# Patient Record
Sex: FEMALE | Race: WHITE | URBAN - METROPOLITAN AREA
[De-identification: names, ages, dates, MRNs, and addresses within clinical notes are randomized per-mention and may not be internally consistent; named-entity substitution may affect disease eponyms.]

---

## 2017-06-21 ENCOUNTER — OFFICE VISIT (OUTPATIENT)
Dept: DERMATOLOGY | Age: 37
End: 2017-06-21

## 2017-06-21 DIAGNOSIS — D22.9 BENIGN NEVUS: ICD-10-CM

## 2017-06-21 DIAGNOSIS — L91.8 INFLAMED SKIN TAG: Primary | ICD-10-CM

## 2017-06-21 DIAGNOSIS — Z86.018 HISTORY OF DYSPLASTIC NEVUS: ICD-10-CM

## 2017-06-21 DIAGNOSIS — L71.9 ROSACEA: ICD-10-CM

## 2017-06-21 PROCEDURE — 99214 OFFICE O/P EST MOD 30 MIN: CPT | Performed by: DERMATOLOGY

## 2017-06-21 PROCEDURE — 11200 RMVL SKIN TAGS UP TO&INC 15: CPT | Performed by: DERMATOLOGY

## 2017-06-21 RX ORDER — METRONIDAZOLE 7.5 MG/G
GEL TOPICAL
Qty: 60 G | Refills: 3 | Status: SHIPPED | OUTPATIENT
Start: 2017-06-21 | End: 2019-05-23

## 2017-11-22 ENCOUNTER — OFFICE VISIT (OUTPATIENT)
Dept: ORTHOPEDIC SURGERY | Age: 37
End: 2017-11-22

## 2017-11-22 VITALS
DIASTOLIC BLOOD PRESSURE: 84 MMHG | HEART RATE: 73 BPM | HEIGHT: 64 IN | BODY MASS INDEX: 32.44 KG/M2 | SYSTOLIC BLOOD PRESSURE: 126 MMHG | WEIGHT: 190 LBS

## 2017-11-22 DIAGNOSIS — S46.111A LABRAL TEAR OF LONG HEAD OF BICEPS TENDON, RIGHT, INITIAL ENCOUNTER: Primary | ICD-10-CM

## 2017-11-22 PROCEDURE — 73030 X-RAY EXAM OF SHOULDER: CPT | Performed by: ORTHOPAEDIC SURGERY

## 2017-11-22 PROCEDURE — 99203 OFFICE O/P NEW LOW 30 MIN: CPT | Performed by: ORTHOPAEDIC SURGERY

## 2017-11-22 RX ORDER — MELOXICAM 15 MG/1
15 TABLET ORAL DAILY
Qty: 30 TABLET | Refills: 3 | Status: SHIPPED | OUTPATIENT
Start: 2017-11-22 | End: 2018-03-23 | Stop reason: SDUPTHER

## 2017-11-22 NOTE — PROGRESS NOTES
Chief Complaint    New Patient (right shoulder pain)      History of Present Illness:  Glory Blanco is a 30 y.o. woman here today for right shoulder evaluation. Patient states that she has had intermittent pain in her right shoulder since she was in high school. She swam competitively through high school and through her freshman year in college. She states that since her competitive swimming career has been over she has been very active with resistance exercises. She states that she is now having difficulties with doing pushups and other body weight exercises. She denies any real inciting event or trauma, but states that certain positions have always bothered her since she was in high school. She denies any numbness tingling or paresthesias of the right upper extremity. She denies any symptoms in the left shoulder. When asked to localize the pain she points over the top of her shoulder. Pain Assessment  Location of Pain: Shoulder  Location Modifiers: Right  Severity of Pain: 3  Quality of Pain: Aching  Frequency of Pain: Intermittent  Aggravating Factors: Exercise (reaching up and behind)  Limiting Behavior: Yes  Result of Injury: No  Work-Related Injury: No  Are there other pain locations you wish to document?: No    Medical History:    No notes on file    Patient's medications, allergies, past medical, surgical, social and family histories were reviewed and updated as appropriate. History reviewed. No pertinent past medical history. Social History     Social History    Marital status: Single     Spouse name: N/A    Number of children: N/A    Years of education: N/A     Occupational History    Not on file.      Social History Main Topics    Smoking status: Never Smoker    Smokeless tobacco: Never Used    Alcohol use Yes    Drug use: Unknown    Sexual activity: Not on file     Other Topics Concern    Not on file     Social History Narrative    No narrative on file Allergies   Allergen Reactions    Gluten Meal     Sulfa Antibiotics Nausea And Vomiting     Current Outpatient Prescriptions on File Prior to Visit   Medication Sig Dispense Refill    metroNIDAZOLE (METROGEL) 0.75 % gel Apply to face BID 60 g 3    sertraline (ZOLOFT) 50 MG tablet       EPIPEN 2-MICHEL 0.3 MG/0.3ML SOAJ injection       methylphenidate (RITALIN) 20 MG tablet Take 20 mg by mouth 2 times daily      Mometasone Furoate (NASONEX NA) by Nasal route       No current facility-administered medications on file prior to visit. Review of Systems:  Relevant review of systems reviewed and available in the patient's chart    Vital Signs:  Vitals:    11/22/17 1721   BP: 126/84   Pulse: 73       Physical Exam    General:  AAOx3, No acute distress     right Shoulder Examination:    Inspection:  No gross abnormalities    Palpation:  No tenderness over the rotator cuff footprint, the acromioclavicular joint, or the posterior capsule    Active Range of Motion: Forward elevation 160°, Abduction 160°, ER 40°, IR T8    Passive Range of Motion: As above    Strength:  5/5 strength testing of Elizabeth test, 5/5 strength of resisted ER, and 5/5 strength of belly press test    Special Tests:  Positive resisted pro sign. Positive Whipple. Positive [place]'s. Neurovascular: Normal motor and sensory function to radial, ulnar, median, and axillary nerve distributions. Palpable radial pulse with brisk (<3sec) capillary refill. Radiology:     Plain radiographs of the right shoulder, comprising 3 views were obtained and reviewed in the office:     Impression: No focal bony abnormalities or obvious osseous defects         Assessment :  Right shoulder biceps-labrum complex tear    Impression:  Encounter Diagnosis   Name Primary?     Labral tear of long head of biceps tendon, right, initial encounter Yes       Office Procedures:  Orders Placed This Encounter   Procedures    XR SHOULDER RIGHT (MIN 2 VIEWS)     [id number]

## 2017-11-28 RX ORDER — DIAZEPAM 5 MG/1
TABLET ORAL
Qty: 2 TABLET | Refills: 0 | Status: SHIPPED | OUTPATIENT
Start: 2017-11-28 | End: 2018-05-02

## 2017-12-06 ENCOUNTER — OFFICE VISIT (OUTPATIENT)
Dept: ORTHOPEDIC SURGERY | Age: 37
End: 2017-12-06

## 2017-12-06 VITALS
HEIGHT: 64 IN | WEIGHT: 190.04 LBS | HEART RATE: 63 BPM | BODY MASS INDEX: 32.44 KG/M2 | DIASTOLIC BLOOD PRESSURE: 72 MMHG | SYSTOLIC BLOOD PRESSURE: 111 MMHG

## 2017-12-06 DIAGNOSIS — M75.111 NONTRAUMATIC INCOMPLETE RUPTURE OF ROTATOR CUFF, RIGHT: Primary | ICD-10-CM

## 2017-12-06 PROCEDURE — 99213 OFFICE O/P EST LOW 20 MIN: CPT | Performed by: ORTHOPAEDIC SURGERY

## 2017-12-09 NOTE — PROGRESS NOTES
History of Present Illness:  [address] for follow-up of her right shoulder and to review the results of the MRI. She tolerated the MRI well. Since her last visit she's been taking meloxicam 15 mg once a day. She thinks that it may be helping a little bit. She has not had any other intervention. She denies any reinjury. Medical History:  Patient's medications, allergies, past medical, surgical, social and family histories were reviewed and updated as appropriate. Pertinent items are noted in HPI  Review of systems reviewed from Patient History Form dated on November 22, 2017 and available in the patient's chart under the Media tab. Vital Signs:  Vitals:    12/06/17 1529   BP: 111/72   Pulse: 63     Constitutional: She is in no apparent distress. She appears her stated age of 30. Right Shoulder Examination:    Inspection:  The right shoulder is benign appearing with no signs of any reinjury. Palpation:  Point of maximal tenderness is over the rotator cuff. No obvious crepitus. Active Range of Motion: Range of motion is preserved but painful at the end range. Passive Range of Motion:  Painful arc and elevation. Strength:  Weakness and pain inhibition of supraspinatus strength testing. Special Tests:  Positive Neer and positive Elizabeth test.  No Tristin muscle deformity. Radiology:     The MRI shows a right shoulder Rim rent lesion. This is a focal high-grade partial thickness tear measuring 5-6 mm in length. There is a type III acromion with an obvious look. Assessment :  Partial rotator cuff tearing with impingement right shoulder. A trial of conservative treatment is most appropriate for this 47-year-old woman. Impression:  Encounter Diagnosis   Name Primary?     Nontraumatic incomplete rupture of rotator cuff, right Yes       Office Procedures:  Orders Placed This Encounter   Procedures    Ambulatory referral to Physical Therapy     Referral Priority: Routine     Referral Type:   Eval and Treat     Referral Reason:   Specialty Services Required     Requested Specialty:   Physical Therapy     Number of Visits Requested:   1       Treatment Plan:  I offered a corticosteroid injection but she would like to hold off on this. We'll have her continue with the meloxicam.  Most importantly we'll get her into physical therapy. She'll work on 4 quadrant stretches and periscapular and rotator cuff strengthening. We've written a prescription. Follow-up will be in for 5 weeks. All questions were answered today. Dona Gutiérrez MD, PhD  12/6/2017

## 2017-12-14 ENCOUNTER — HOSPITAL ENCOUNTER (OUTPATIENT)
Dept: PHYSICAL THERAPY | Age: 37
Discharge: HOME | End: 2017-12-31
Admitting: ORTHOPAEDIC SURGERY

## 2017-12-14 NOTE — PLAN OF CARE
The [address] [place] [address]      Physical Therapy Certification    Dear Referring Practitioner: Dr. Lees Rater,    We had the pleasure of evaluating the following patient for physical therapy services at [address] [place] [address]. A summary of our findings can be found in the initial assessment below. This includes our plan of care. If you have any questions or concerns regarding these findings, please do not hesitate to contact me at the office phone number checked above. Thank you for the referral.       Physician Signature:_______________________________Date:__________________  By signing above (or electronic signature), therapists plan is approved by physician      Patient: Anny Sotelo   : 1980   MRN: [id number]  Referring Physician: Referring Practitioner: Dr. Lees Rater      Evaluation Date: 2017      Medical Diagnosis Information:  Diagnosis: M75.111 (ICD-10-CM) - Nontraumatic incomplete rupture of rotator cuff, right   Treatment Diagnosis: Decreased functional mobility ; Decreased ADL status; Decreased ROM; Decreased strength; d/t pain overhead from possible RCT                                         Insurance information: PT Insurance Information: PT BENEFITS 2017 FACILITY/ UMR/ EFFECTIVE 17/ ACTIVE/  MET/ OOP 4000/ COPAY 0/ PAYS 80%/ 20 VPCY/ 0 AUTH REQ/ COLLECT 50 FOR EVAL AND 25 FOR FOLLOW UP VISITS/ 17 CB/     Precautions/ Contra-indications:  Latex Allergy:  [x]NO      []YES  Preferred Language for Healthcare:   [x]English       []other:    SUBJECTIVE: Patient is a 29 y/o female who presents with c/o RUE pain; pt is RHD. States that she has had intermittent pain in her right shoulder since she was in high school. She swam competitively through high school and through her freshman year in college.   She states that since her competitive swimming career has been over she has been very active with resistance exercises. She states that she is now having difficulties with doing pushups and other body weight exercises. She denies any real inciting event or trauma, but states that certain positions have always bothered her since she was in high school. Struggles to blow dry her hair and with lifting objects that are over 10# overhead due to pain. When asked to localize the pain she points over the top of her shoulder. Had MRI, per report: has \"focal parital articular-sided microsavulsion of fibers along the farthest anterior and lateral supraspinatus tendon without substantive retraction\". Relevant Medical History: N/A  Functional Disability Index:PT G-Codes  Functional Assessment Tool Used: UEFI  Score: 61/80 or 24% deficit  Functional Limitation: Carrying, moving and handling objects  Carrying, Moving and Handling Objects Current Status (): At least 20 percent but less than 40 percent impaired, limited or restricted  Carrying, Moving and Handling Objects Goal Status (): At least 1 percent but less than 20 percent impaired, limited or restricted    Pain Scale: 2-6/10  Easing factors: rest, meloxicam  Provocative factors: any bodyweight or athletic activities as well as lifting overhead and reaching behind her head    Type: []Constant   [x]Intermittent  []Radiating [x]Localized []other:     Numbness/Tingling: N/A    Occupation/School: School Administration    Living Status/Prior Level of Function: Independent with ADLs and IADLs, lifting overhead and performing athletic activities.       OBJECTIVE:     ROM PROM AROM  Comment    L R L R    Flexion 155 145      Abduction 155 140      ER 95 95      IR 45 31          Strength L R Comment   Flexion 4+ 4+    Abduction 4+ 4+    ER 4 4-    IR 4+ 4+        Special Tests Results/Comment   Renan pos   Neers pos   Empty Can pos   Belly Press neg   ER Lag neg   Hornblower neg     Reflexes/Sensation:    [x]Dermatomes/Myotomes intact    [x]Reflexes equal and normal bilaterally   []Other:    Joint mobility:   [x]Normal    []Hypo   []Hyper    Palpation: ttp at [facility] origin                      [x] Patient history, allergies, meds reviewed. Medical chart reviewed. See intake form. Review Of Systems (ROS):  [x]Performed Review of systems (Integumentary, CardioPulmonary, Neurological) by intake and observation. Intake form has been scanned into medical record. Patient has been instructed to contact their primary care physician regarding ROS issues if not already being addressed at this time. Co-morbidities/Complexities (which will affect course of rehabilitation):   []None           Arthritic conditions   []Rheumatoid arthritis (M05.9)  []Osteoarthritis (M19.91)   Cardiovascular conditions   []Hypertension (I10)  []Hyperlipidemia (E78.5)  []Angina pectoris (I20)  []Atherosclerosis (I70)   Musculoskeletal conditions   []Disc pathology   []Congenital spine pathologies   []Prior surgical intervention  []Osteoporosis (M81.8)  []Osteopenia (M85.8)   Endocrine conditions   []Hypothyroid (E03.9)  []Hyperthyroid Gastrointestinal conditions   []Constipation (X12.66)   Metabolic conditions   []Morbid obesity (E66.01)  []Diabetes type 1(E10.65) or 2 (E11.65)   []Neuropathy (G60.9)     Pulmonary conditions   []Asthma (J45)  []Coughing   []COPD (J44.9)   Psychological Disorders  [x]Anxiety (F41.9)  [x]Depression (F32.9)   []Other:   []Other:          Barriers to/and or personal factors that will affect rehab potential:              []Age  []Sex              [x]Motivation/Lack of Motivation                        []Co-Morbidities              []Cognitive Function, education/learning barriers              []Environmental, home barriers              []profession/work barriers  []past PT/medical experience  []other:  Justification:     Falls Risk Assessment (30 days):   [x] Falls Risk assessed and no intervention required.   [] Falls Risk assessed and Patient requires intervention due (typically 45 minutes face-to-face)  [] RE-EVAL     PLAN:  Frequency/Duration:  2 days per week for 12 Weeks:  INTERVENTIONS:  [x] Therapeutic exercise including: strength training, ROM, for Upper extremity and core   [x]  NMR activation and proprioception for UE, scap and Core   [x] Manual therapy as indicated for shoulder, scapula and spine to include: Dry Needling/IASTM, STM, PROM, Gr I-IV mobilizations, manipulation. [x] Modalities as needed that may include: thermal agents, E-stim, Biofeedback, US, iontophoresis as indicated  [x] Patient education on joint protection, postural re-education, activity modification, progression of HEP. HEP instruction: PT updated HEP and reviewed with pt in regard to progression and implementation; pt verbalized understanding with all questions answered. (see scanned forms)    GOALS:  Patient stated goal: reduce pain so she can return to weight training and bodyweight training    Therapist goals for Patient:   Short Term Goals: To be achieved in: 2 weeks  1. Independent in HEP and progression per patient tolerance, in order to prevent re-injury. 2. Patient will have a decrease in pain to facilitate improvement in movement, function, and ADLs as indicated by Functional Deficits. Long Term Goals: To be achieved in: 12 weeks  1. Disability index score of 10% or less for the UEFS to assist with reaching prior level of function. 2. Patient will demonstrate increased AROM to 40IR to allow for proper joint functioning as indicated by patients Functional Deficits. 3. Patient will demonstrate an increase in Strength to good scapular and core control  for UE to allow for proper functional mobility as indicated by patients Functional Deficits. 4. Patient will return to functional activities without increased symptoms or restriction. 5. Patient will perform a full plank for 30\" and perform modified push ups without increased symptoms or restriction.          Electronically

## 2017-12-14 NOTE — FLOWSHEET NOTE
achieved in: 2 weeks  1. Independent in HEP and progression per patient tolerance, in order to prevent re-injury. 2. Patient will have a decrease in pain to facilitate improvement in movement, function, and ADLs as indicated by Functional Deficits.     Long Term Goals: To be achieved in: 12 weeks  1. Disability index score of 10% or less for the UEFS to assist with reaching prior level of function. 2. Patient will demonstrate increased AROM to 40IR to allow for proper joint functioning as indicated by patients Functional Deficits. 3. Patient will demonstrate an increase in Strength to good scapular and core control  for UE to allow for proper functional mobility as indicated by patients Functional Deficits. 4. Patient will return to functional activities without increased symptoms or restriction. 5. Patient will perform a full plank for 30\" and perform modified push ups without increased symptoms or restriction. Progression Towards Functional goals:  [] Patient is progressing as expected towards functional goals listed. [] Progression is slowed due to complexities listed. [] Progression has been slowed due to co-morbidities. [x] Plan just implemented, too soon to assess goals progression  [] Other:     ASSESSMENT:  See eval    Treatment/Activity Tolerance:  [x] Patient tolerated treatment well [] Patient limited by fatique  [] Patient limited by pain  [] Patient limited by other medical complications  [] Other:     Patient education: Pt reviewed HEP and POC with pt; pt agreed with all questions answered. Pt to call PT with any questions    Prognosis: [x] Good [] Fair  [] Poor    Patient Requires Follow-up: [x] Yes  [] No    PLAN: See eval  [] Continue per plan of care [] Alter current plan (see comments)  [x] Plan of care initiated [] Hold pending MD visit [] Discharge    Electronically signed by: Alejandro Lao PT, DPT      Herbert Mcmullen.  Enio Schofield, DPT, [id number]  Physical

## 2017-12-20 ENCOUNTER — HOSPITAL ENCOUNTER (OUTPATIENT)
Dept: PHYSICAL THERAPY | Age: 37
Discharge: HOME | End: 2017-12-20
Admitting: ORTHOPAEDIC SURGERY

## 2017-12-20 NOTE — FLOWSHEET NOTE
[facility]  Orthopaedics and Sports Rehabilitation, [facility]       Physical Therapy Daily Treatment Note  Date:  2017    Patient Name:  Shayla Ferrera    :  1980  MRN: [id number]  Restrictions/Precautions:    Medical/Treatment Diagnosis Information:  · Diagnosis: M75.111 (ICD-10-CM) - Nontraumatic incomplete rupture of rotator cuff, right  · Treatment Diagnosis: Decreased functional mobility; Decreased ADL status; Decreased ROM; Decreased strength; d/t pain overhead from  possible RCT  Insurance/Certification information:  PT Insurance Information: PT BENEFITS 2017 FACILITY/ UMR/ EFFECTIVE 17/ ACTIVE/  MET/ OOP 4000/ COPAY 0/ PAYS 80%/ 20 VPCY/ 0 AUTH REQ/ COLLECT 50 FOR EVAL AND 25 FOR FOLLOW UP VISITS/ 17 CB/   Physician Information:  Referring Practitioner: Dr. Ata Nevarez of care signed (Y/N):      Date of Patient follow up with Physician:     G-Code (if applicable):      Date G-Code Applied:  17       Progress Note: [x]  Yes  []  No  Next due by: Visit #10 3/8/18      Latex Allergy:  [x]NO      []YES  Preferred Language for Healthcare:   [x]English       []other:    Visit # Insurance Allowable   2 20     Pain level:  1-2/10     SUBJECTIVE:  Pt states doing well with decreased pain overall and feels better t/o day. Is able to do modified pushups during barre class without discomfort. Reports compliance with HEP without any c/o pain or discomfort. Notes being sore for 1 day after evaluation.       OBJECTIVE:      61/80 or 24% deficit            ROM PROM AROM  Comment     L R L R     Flexion 155 145         Abduction 155 140         ER 95 95         IR 45 31               Strength L R Comment   Flexion 4+ 4+     Abduction 4+ 4+     ER 4 4-     IR 4+ 4+           Special Tests Results/Comment   Marjan-Ariel pos   Neers pos   Empty Can pos   Belly Press neg   ER Lag neg   Hornblower neg      Reflexes/Sensation:               [x]Dermatomes/Myotomes intact [x]Reflexes equal and normal bilaterally              []Other:     Joint mobility:              [x]Normal               []Hypo              []Hyper     Palpation: ttp at [facility] origin    RESTRICTIONS/PRECAUTIONS:     Exercises/Interventions:   Exercises:  Exercise/Equipment Resistance/Repetitions Other comments   Stretching/PROM     Wand     Wall Slides x10    UE Dewar     Pulleys     Pendulum          Isometrics     Retraction          Weight shift     Flexion     Abduction     External Rotation Blue, 10x10\"    Internal Rotation Black, 10x10\"    Biceps     Triceps          PRE's     Flexion 3#, x30 S/L   Abduction 3#, x30 S/L   External Rotation     Internal Rotation     Shrugs     EXT     Reverse Flys     Serratus     Horizontal Abd with ER     Biceps     Triceps     Retraction          Cable Column/Theraband     External Rotation     Internal Rotation     Shrugs     Lats     Ext Blue, x30    Flex     Scapular Retraction Black, x30    BIC     TRIC     PNF          Dynamic Stability     Pushup + x45 At counter   BoW 2#, 3x30\"  Cw/CCw   BodyBlade ER/IR 3x30\" Small at side        Plyoback          Manual interventions                     Therapeutic Exercise and NMR EXR  [x] (09889) Provided verbal/tactile cueing for activities related to strengthening, flexibility, endurance, ROM  for improvements in scapular, scapulothoracic and UE control with self care, reaching, carrying, lifting, house/yardwork, driving/computer work. [x] (21538) Provided verbal/tactile cueing for activities related to improving balance, coordination, kinesthetic sense, posture, motor skill, proprioception  to assist with  scapular, scapulothoracic and UE control with self care, reaching, carrying, lifting, house/yardwork, driving/computer work.     Therapeutic Activities:    [x] (39881 or 47898) Provided verbal/tactile cueing for activities related to improving balance, coordination, kinesthetic sense, posture, motor skill, proprioception and motor activation to allow for proper function of scapular, scapulothoracic and UE control with self care, carrying, lifting, driving/computer work. Home Exercise Program:    [x] (15241) Reviewed/Progressed HEP activities related to strengthening, flexibility, endurance, ROM of scapular, scapulothoracic and UE control with self care, reaching, carrying, lifting, house/yardwork, driving/computer work  [] (14140) Reviewed/Progressed HEP activities related to improving balance, coordination, kinesthetic sense, posture, motor skill, proprioception of scapular, scapulothoracic and UE control with self care, reaching, carrying, lifting, house/yardwork, driving/computer work      Manual Treatments:  PROM / STM / Oscillations-Mobs:  G-I, II, III, IV (PA's, Inf., Post.)  [] (89242) Provided manual therapy to mobilize soft tissue/joints of cervical/CT, scapular GHJ and UE for the purpose of modulating pain, promoting relaxation,  increasing ROM, reducing/eliminating soft tissue swelling/inflammation/restriction, improving soft tissue extensibility and allowing for proper ROM for normal function with self care, reaching, carrying, lifting, house/yardwork, driving/computer work    Modalities:  15' ice    Charges:  Timed Code Treatment Minutes: 35   Total Treatment Minutes: 50       [] EVAL (LOW) 04686 (typically 20 minutes face-to-face)  [] EVAL (MOD) 38473 (typically 30 minutes face-to-face)  [] EVAL (HIGH) 99040 (typically 45 minutes face-to-face)  [] RE-EVAL      [x] YS(18229) x  1   [] IONTO  [x] NMR (83798) x  1   [] VASO  [] Manual ([id number]) x       [] Other:  [x] TA x  1    [] Mech Traction (90375)  [] ES(attended) (70159)      [] ES (un) (41053):     GOALS:  Patient stated goal: reduce pain so she can return to weight training and bodyweight training     Therapist goals for Patient:   Short Term Goals: To be achieved in: 2 weeks  1.  Independent in HEP and progression per patient tolerance, in order care [] Alter current plan (see comments)  [] Plan of care initiated [] Hold pending MD visit [] Discharge    Electronically signed by: Nabila Darden PT, DPT      MERCEDES VelascoT, [id number]  Physical Therapist  [email or web address]

## 2017-12-28 ENCOUNTER — HOSPITAL ENCOUNTER (OUTPATIENT)
Dept: PHYSICAL THERAPY | Age: 37
Discharge: HOME | End: 2017-12-28
Admitting: ORTHOPAEDIC SURGERY

## 2017-12-28 NOTE — FLOWSHEET NOTE
[facility]  Orthopaedics and Sports Rehabilitation, [facility]       Physical Therapy Daily Treatment Note  Date:  2017    Patient Name:  Ewa Hernandez    :  1980  MRN: [id number]  Restrictions/Precautions:    Medical/Treatment Diagnosis Information:  · Diagnosis: M75.111 (ICD-10-CM) - Nontraumatic incomplete rupture of rotator cuff, right  · Treatment Diagnosis: Decreased functional mobility; Decreased ADL status; Decreased ROM; Decreased strength; d/t pain overhead from  possible RCT  Insurance/Certification information:  PT Insurance Information: PT BENEFITS 2017 FACILITY/ UMR/ EFFECTIVE 17/ ACTIVE/  MET/ OOP 4000/ COPAY 0/ PAYS 80%/ 20 VPCY/ 0 AUTH REQ/ COLLECT 50 FOR EVAL AND 25 FOR FOLLOW UP VISITS/ 17 CB/   Physician Information:  Referring Practitioner: Dr. Twila Velásquez of care signed (Y/N):      Date of Patient follow up with Physician:     G-Code (if applicable):      Date G-Code Applied:  17       Progress Note: [x]  Yes  []  No  Next due by: Visit #10 3/8/18      Latex Allergy:  [x]NO      []YES  Preferred Language for Healthcare:   [x]English       []other:    Visit # Insurance Allowable   3 20     Pain level: 4/10     SUBJECTIVE:  Pt states doing well with decreased pain overall and feels better t/o day. However, notes having increased soreness this date after helping her mom move items out of her home. Reports compliance with HEP without any c/o pain or discomfort.      OBJECTIVE:      61/80 or 24% deficit            ROM PROM AROM  Comment     L R L R     Flexion 155 145         Abduction 155 140         ER 95 95         IR 45 31               Strength L R Comment   Flexion 4+ 4+     Abduction 4+ 4+     ER 4 4-     IR 4+ 4+           Special Tests Results/Comment   Renan pos   Neers pos   Empty Can pos   Belly Press neg   ER Lag neg   Hornblower neg      Reflexes/Sensation:               [x]Dermatomes/Myotomes intact [x]Reflexes equal and normal bilaterally              []Other:     Joint mobility:              [x]Normal               []Hypo              []Hyper     Palpation: ttp at [facility] origin    RESTRICTIONS/PRECAUTIONS:     Exercises/Interventions:   Exercises:  Exercise/Equipment Resistance/Repetitions Other comments   Stretching/PROM     Wand     Wall Slides x30    UE [place]     Pulleys     Pendulum          Isometrics     Retraction          Weight shift     Flexion     Abduction     External Rotation Blue, 20x10\"    Internal Rotation Black, 20x10\"    Biceps     Triceps          PRE's     Flexion 5#, x30 S/L   Abduction 5#, x30 S/L   Flexion 3#, x30 HOB 30deg elevated in supine   External Rotation     Internal Rotation     Shrugs     EXT     Reverse Flys     Serratus     Horizontal Abd with ER     Biceps     Triceps     Retraction          Cable Column/Theraband     External Rotation     Internal Rotation     Shrugs     Lats     Ext Blue, x45    Flex     Scapular Retraction Black, x45    BIC     TRIC     PNF          Dynamic Stability     Pushup + x45 At counter   BoW 2#, 3x30\"  Cw/CCw   BodyBlade ER/IR 5x30\" Small at side        Plyoback          Manual interventions                     Therapeutic Exercise and NMR EXR  [x] (55280) Provided verbal/tactile cueing for activities related to strengthening, flexibility, endurance, ROM  for improvements in scapular, scapulothoracic and UE control with self care, reaching, carrying, lifting, house/yardwork, driving/computer work. [x] (67365) Provided verbal/tactile cueing for activities related to improving balance, coordination, kinesthetic sense, posture, motor skill, proprioception  to assist with  scapular, scapulothoracic and UE control with self care, reaching, carrying, lifting, house/yardwork, driving/computer work.     Therapeutic Activities:    [x] (86762 or 04758) Provided verbal/tactile cueing for activities related to improving balance, coordination, kinesthetic sense, posture, motor skill, proprioception and motor activation to allow for proper function of scapular, scapulothoracic and UE control with self care, carrying, lifting, driving/computer work. Home Exercise Program:    [x] (21773) Reviewed/Progressed HEP activities related to strengthening, flexibility, endurance, ROM of scapular, scapulothoracic and UE control with self care, reaching, carrying, lifting, house/yardwork, driving/computer work  [] (61958) Reviewed/Progressed HEP activities related to improving balance, coordination, kinesthetic sense, posture, motor skill, proprioception of scapular, scapulothoracic and UE control with self care, reaching, carrying, lifting, house/yardwork, driving/computer work      Manual Treatments:  PROM / STM / Oscillations-Mobs:  G-I, II, III, IV (PA's, Inf., Post.)  [] (08743) Provided manual therapy to mobilize soft tissue/joints of cervical/CT, scapular GHJ and UE for the purpose of modulating pain, promoting relaxation,  increasing ROM, reducing/eliminating soft tissue swelling/inflammation/restriction, improving soft tissue extensibility and allowing for proper ROM for normal function with self care, reaching, carrying, lifting, house/yardwork, driving/computer work    Modalities:  15' ice    Charges:  Timed Code Treatment Minutes: 40   Total Treatment Minutes: 55       [] EVAL (LOW) 00634 (typically 20 minutes face-to-face)  [] EVAL (MOD) 34331 (typically 30 minutes face-to-face)  [] EVAL (HIGH) 66124 (typically 45 minutes face-to-face)  [] RE-EVAL      [x] IZ(30213) x  1   [] IONTO  [x] NMR (76975) x  1   [] VASO  [] Manual ([id number]) x       [] Other:  [x] TA x  1    [] Mech Traction (26985)  [] ES(attended) (02875)      [] ES (un) (52514):     GOALS:  Patient stated goal: reduce pain so she can return to weight training and bodyweight training     Therapist goals for Patient:   Short Term Goals: To be achieved in: 2 weeks  1.  Independent in HEP and

## 2018-01-01 ENCOUNTER — HOSPITAL ENCOUNTER (OUTPATIENT)
Dept: PHYSICAL THERAPY | Age: 38
Discharge: HOME | End: 2018-01-31
Attending: ORTHOPAEDIC SURGERY | Admitting: ORTHOPAEDIC SURGERY

## 2018-01-04 ENCOUNTER — HOSPITAL ENCOUNTER (OUTPATIENT)
Dept: PHYSICAL THERAPY | Age: 38
Discharge: HOME | End: 2018-01-04
Admitting: ORTHOPAEDIC SURGERY

## 2018-01-04 NOTE — FLOWSHEET NOTE
[facility]  Orthopaedics and Sports Rehabilitation, [facility]       Physical Therapy Daily Treatment Note  Date:  2018    Patient Name:  Sven Ayala    :  1980  MRN: [id number]  Restrictions/Precautions:    Medical/Treatment Diagnosis Information:  · Diagnosis: M75.111 (ICD-10-CM) - Nontraumatic incomplete rupture of rotator cuff, right  · Treatment Diagnosis: Decreased functional mobility; Decreased ADL status; Decreased ROM; Decreased strength; d/t pain overhead from  possible RCT  Insurance/Certification information:  PT Insurance Information: PT BENEFITS 2017 FACILITY/ UMR/ EFFECTIVE 17/ ACTIVE/  MET/ OOP 4000/ COPAY 0/ PAYS 80%/ 20 VPCY/ 0 AUTH REQ/ COLLECT 50 FOR EVAL AND 25 FOR FOLLOW UP VISITS/ 17 CB/   Physician Information:  Referring Practitioner: Dr. Tenisha Godinez of care signed (Y/N):      Date of Patient follow up with Physician:     G-Code (if applicable):      Date G-Code Applied:  17       Progress Note: [x]  Yes  []  No  Next due by: Visit #10 3/8/18      Latex Allergy:  [x]NO      []YES  Preferred Language for Healthcare:   [x]English       []other:    Visit # Insurance Allowable   4 20     Pain level: 2/10     SUBJECTIVE:  Pt states doing well with decreased pain overall and continues to feel better. However, notes some of her improvement may be due to limiting herself from higher level activities such as work out classes that would bother her arm. Reports that she can still \"feel a difference\" between the two arms when she raises them overhead. Notes being sore into the next day after last session.       OBJECTIVE:      61/80 or 24% deficit            ROM PROM AROM  Comment     L R L R     Flexion 155 145         Abduction 155 140         ER 95 95         IR 45 31               Strength L R Comment   Flexion 4+ 4+     Abduction 4+ 4+     ER 4 4-     IR 4+ 4+           Special Tests Results/Comment   Renan pos   Neers pos   Empty Can pos   Belly Press neg   ER Lag neg   Hornblower neg      Reflexes/Sensation:               [x]Dermatomes/Myotomes intact               [x]Reflexes equal and normal bilaterally              []Other:     Joint mobility:              [x]Normal               []Hypo              []Hyper     Palpation: ttp at [facility] origin    RESTRICTIONS/PRECAUTIONS:     Exercises/Interventions:   Exercises:  Exercise/Equipment Resistance/Repetitions Other comments   Stretching/PROM     Wand        UE [place]     Pulleys     Pendulum          Isometrics     Retraction          Weight shift     Flexion     Abduction     External Rotation Black, 10x10\"    Internal Rotation Silver, 10x10\"    Biceps     Triceps          PRE's     Flexion 5#, x45 S/L   Abduction 5#, x45 S/L   Flexion 3#, x30 HOB 30deg elevated in supine   External Rotation     Internal Rotation     Shrugs     EXT     Reverse Flys     Serratus     Horizontal Abd with ER     Biceps     Triceps     Retraction          Cable Column/Theraband     External Rotation     Internal Rotation     Shrugs     Lats     Ext Black, x30    Flex     Scapular Retraction Silver, x30    BIC     TRIC     PNF          Dynamic Stability     Pushup + x30, x20 At counter, Table   BoW 4#, 3x30\"  Cw/CCw   BodyBlade ER/IR 3x30\" Large at side   BodyBlade 2 way 3x30\" Left/Right, Up/Down        P[place]oback          Manual interventions                     Therapeutic Exercise and NMR EXR  [x] (26216) Provided verbal/tactile cueing for activities related to strengthening, flexibility, endurance, ROM  for improvements in scapular, scapulothoracic and UE control with self care, reaching, carrying, lifting, house/yardwork, driving/computer work.     [x] (53879) Provided verbal/tactile cueing for activities related to improving balance, coordination, kinesthetic sense, posture, motor skill, proprioception  to assist with  scapular, scapulothoracic and UE control with self care, reaching, carrying, lifting, house/yardwork, driving/computer work. Therapeutic Activities:    [x] (93431 or 68076) Provided verbal/tactile cueing for activities related to improving balance, coordination, kinesthetic sense, posture, motor skill, proprioception and motor activation to allow for proper function of scapular, scapulothoracic and UE control with self care, carrying, lifting, driving/computer work.      Home Exercise Program:    [x] (51244) Reviewed/Progressed HEP activities related to strengthening, flexibility, endurance, ROM of scapular, scapulothoracic and UE control with self care, reaching, carrying, lifting, house/yardwork, driving/computer work  [] (37708) Reviewed/Progressed HEP activities related to improving balance, coordination, kinesthetic sense, posture, motor skill, proprioception of scapular, scapulothoracic and UE control with self care, reaching, carrying, lifting, house/yardwork, driving/computer work      Manual Treatments:  PROM / STM / Oscillations-Mobs:  G-I, II, III, IV (PA's, Inf., Post.)  [] (80944) Provided manual therapy to mobilize soft tissue/joints of cervical/CT, scapular GHJ and UE for the purpose of modulating pain, promoting relaxation,  increasing ROM, reducing/eliminating soft tissue swelling/inflammation/restriction, improving soft tissue extensibility and allowing for proper ROM for normal function with self care, reaching, carrying, lifting, house/yardwork, driving/computer work    Modalities:  15' ice    Charges:  Timed Code Treatment Minutes: 40   Total Treatment Minutes: 55       [] EVAL (LOW) 32931 (typically 20 minutes face-to-face)  [] EVAL (MOD) 80430 (typically 30 minutes face-to-face)  [] EVAL (HIGH) 99344 (typically 45 minutes face-to-face)  [] RE-EVAL      [x] SP(28123) x  1   [] IONTO  [x] NMR (40578) x  1   [] VASO  [] Manual (58112) x       [] Other:  [x] TA x  1    [] Mech Traction (54101)  [] ES(attended) (93336)      [] ES (un) (86513):     GOALS:  Patient stated goal:

## 2018-01-10 ENCOUNTER — OFFICE VISIT (OUTPATIENT)
Dept: ORTHOPEDIC SURGERY | Age: 38
End: 2018-01-10

## 2018-01-10 VITALS
DIASTOLIC BLOOD PRESSURE: 71 MMHG | WEIGHT: 190 LBS | SYSTOLIC BLOOD PRESSURE: 122 MMHG | BODY MASS INDEX: 32.44 KG/M2 | HEART RATE: 90 BPM | HEIGHT: 64 IN

## 2018-01-10 DIAGNOSIS — M75.111 PARTIAL TEAR OF RIGHT ROTATOR CUFF: Primary | ICD-10-CM

## 2018-01-10 PROCEDURE — 99213 OFFICE O/P EST LOW 20 MIN: CPT | Performed by: ORTHOPAEDIC SURGERY

## 2018-01-10 RX ORDER — LETROZOLE 2.5 MG/1
TABLET, FILM COATED ORAL
Refills: 3 | COMMUNITY
Start: 2017-12-30 | End: 2018-05-02

## 2018-01-10 RX ORDER — METFORMIN HYDROCHLORIDE 750 MG/1
TABLET, EXTENDED RELEASE ORAL
Refills: 6 | COMMUNITY
Start: 2017-12-30 | End: 2018-05-02

## 2018-01-11 ENCOUNTER — HOSPITAL ENCOUNTER (OUTPATIENT)
Dept: PHYSICAL THERAPY | Age: 38
Discharge: HOME | End: 2018-01-11
Admitting: ORTHOPAEDIC SURGERY

## 2018-01-11 NOTE — FLOWSHEET NOTE
[facility]  Orthopaedics and Sports Rehabilitation, [facility]       Physical Therapy Daily Treatment Note  Date:  2018    Patient Name:  Lore Baumgarten    :  1980  MRN: [id number]  Restrictions/Precautions:    Medical/Treatment Diagnosis Information:  · Diagnosis: M75.111 (ICD-10-CM) - Nontraumatic incomplete rupture of rotator cuff, right  · Treatment Diagnosis: Decreased functional mobility; Decreased ADL status; Decreased ROM; Decreased strength; d/t pain overhead from  possible RCT  Insurance/Certification information:  PT Insurance Information: PT BENEFITS 2017 FACILITY/ UMR/ EFFECTIVE 17/ ACTIVE/  MET/ OOP 4000/ COPAY 0/ PAYS 80%/ 20 VPCY/ 0 AUTH REQ/ COLLECT 50 FOR EVAL AND 25 FOR FOLLOW UP VISITS/ 17 CB/   Physician Information:  Referring Practitioner: Dr. Armando Roberto of care signed (Y/N):      Date of Patient follow up with Physician:     G-Code (if applicable):      Date G-Code Applied:  17       Progress Note: [x]  Yes  []  No  Next due by: Visit #10 3/8/18      Latex Allergy:  [x]NO      []YES  Preferred Language for Healthcare:   [x]English       []other:    Visit # Insurance Allowable   5 20     Pain level: 2/10     SUBJECTIVE:  Pt states doing well with continued lower pain and feeling better. Saw MD yesterday with good report. Decided to hold cortisone injection due to improvement in favor of continued therapy. Reports compliance with HEP without any c/o pain or discomfort; able to more pushups at table without pain.       OBJECTIVE:      61/80 or 24% deficit            ROM PROM AROM  Comment     L R L R     Flexion 155 145         Abduction 155 140         ER 95 95         IR 45 31               Strength L R Comment   Flexion 4+ 4+     Abduction 4+ 4+     ER 4 4-     IR 4+ 4+           Special Tests Results/Comment   Marjan-Ariel pos   Neers pos   Empty Can pos   Belly Press neg   ER Lag neg   Hornblower neg      Reflexes/Sensation: To be achieved in: 2 weeks  1. Independent in HEP and progression per patient tolerance, in order to prevent re-injury. 2. Patient will have a decrease in pain to facilitate improvement in movement, function, and ADLs as indicated by Functional Deficits.     Long Term Goals: To be achieved in: 12 weeks  1. Disability index score of 10% or less for the UEFS to assist with reaching prior level of function. 2. Patient will demonstrate increased AROM to 40IR to allow for proper joint functioning as indicated by patients Functional Deficits. 3. Patient will demonstrate an increase in Strength to good scapular and core control  for UE to allow for proper functional mobility as indicated by patients Functional Deficits. 4. Patient will return to functional activities without increased symptoms or restriction. 5. Patient will perform a full plank for 30\" and perform modified push ups without increased symptoms or restriction. Progression Towards Functional goals:  [x] Patient is progressing as expected towards functional goals listed. [] Progression is slowed due to complexities listed. [] Progression has been slowed due to co-morbidities. [] Plan just implemented, too soon to assess goals progression  [] Other:     ASSESSMENT:   Pt tolerated treatment extremely well with excellent carryover and understanding of HEP. However, pt significantly fatigued with added weight/rep and progression of table sequence. As well, struggled to complete all reps of prone HABd with good SBS. Treatment/Activity Tolerance:  [x] Patient tolerated treatment well [] Patient limited by fatique  [] Patient limited by pain  [] Patient limited by other medical complications  [] Other:     Patient education: Pt reviewed HEP and POC with pt; pt agreed with all questions answered.   Pt to call PT with any questions    Prognosis: [x] Good [] Fair  [] Poor    Patient Requires Follow-up: [x] Yes  [] No    PLAN: See

## 2018-01-15 ENCOUNTER — HOSPITAL ENCOUNTER (OUTPATIENT)
Dept: PHYSICAL THERAPY | Age: 38
Discharge: HOME | End: 2018-01-15
Admitting: ORTHOPAEDIC SURGERY

## 2018-01-15 NOTE — FLOWSHEET NOTE
[]Other:     Joint mobility:              [x]Normal               []Hypo              []Hyper     Palpation: ttp at [facility] origin    RESTRICTIONS/PRECAUTIONS:     Exercises/Interventions:   Exercises:  Exercise/Equipment Resistance/Repetitions Other comments   Stretching/PROM     Wand        UE [place]     Pulleys     Pendulum          Isometrics     Retraction          Weight shift     Flexion     Abduction     External Rotation Black, 15x10\"    Internal Rotation Silver, 15x10\"    Biceps     Triceps          PRE's     Flexion 5#, x45 S/L   Abduction 5#, x45 S/L   Flexion 3#, x45 HOB 45deg elevated in supine   External Rotation     Internal Rotation     Shrugs     EXT     Reverse Flys     Serratus     HABdER x45    Biceps     Triceps     Retraction          Cable Column/Theraband     External Rotation     Internal Rotation     Shrugs     Lats     Ext Black, x45    Flex     Scapular Retraction Silver, x45    BIC     TRIC     PNF          Dynamic Stability     Pushup + x30 Table   BoW 4#, 3x30\"  Cw/CCw   BodyBlade ER/IR 3x30\" Large at side   BodyBlade 2 way 3x30\" Small, Left/Right, Up/Down        [place]ack          Manual interventions                 Therapeutic Exercise and NMR EXR  [x] (90042) Provided verbal/tactile cueing for activities related to strengthening, flexibility, endurance, ROM  for improvements in scapular, scapulothoracic and UE control with self care, reaching, carrying, lifting, house/yardwork, driving/computer work. [x] (07473) Provided verbal/tactile cueing for activities related to improving balance, coordination, kinesthetic sense, posture, motor skill, proprioception  to assist with  scapular, scapulothoracic and UE control with self care, reaching, carrying, lifting, house/yardwork, driving/computer work.     Therapeutic Activities:    [x] (75440 or 49766) Provided verbal/tactile cueing for activities related to improving balance, coordination, kinesthetic sense, posture, motor skill, proprioception and motor activation to allow for proper function of scapular, scapulothoracic and UE control with self care, carrying, lifting, driving/computer work. Home Exercise Program:    [x] (39743) Reviewed/Progressed HEP activities related to strengthening, flexibility, endurance, ROM of scapular, scapulothoracic and UE control with self care, reaching, carrying, lifting, house/yardwork, driving/computer work  [] (81706) Reviewed/Progressed HEP activities related to improving balance, coordination, kinesthetic sense, posture, motor skill, proprioception of scapular, scapulothoracic and UE control with self care, reaching, carrying, lifting, house/yardwork, driving/computer work      Manual Treatments:  PROM / STM / Oscillations-Mobs:  G-I, II, III, IV (PA's, Inf., Post.)  [] (99382) Provided manual therapy to mobilize soft tissue/joints of cervical/CT, scapular GHJ and UE for the purpose of modulating pain, promoting relaxation,  increasing ROM, reducing/eliminating soft tissue swelling/inflammation/restriction, improving soft tissue extensibility and allowing for proper ROM for normal function with self care, reaching, carrying, lifting, house/yardwork, driving/computer work    Modalities:  15' ice     Charges:  Timed Code Treatment Minutes: 50'   Total Treatment Minutes: 72'       [] EVAL (LOW) 68532 (typically 20 minutes face-to-face)  [] EVAL (MOD) 70087 (typically 30 minutes face-to-face)  [] EVAL (HIGH) 81435 (typically 45 minutes face-to-face)  [] RE-EVAL      [x] ND(13445) x  1   [] IONTO  [x] NMR (68988) x  1   [] VASO  [] Manual ([id number]) x       [] Other:  [x] TA x  1    [] Mech Traction (78801)  [] ES(attended) (72333)      [] ES (un) (91957):     GOALS:  Patient stated goal: reduce pain so she can return to weight training and bodyweight training     Therapist goals for Patient:   Short Term Goals: To be achieved in: 2 weeks  1.  Independent in HEP and progression per patient tolerance, in

## 2018-01-23 ENCOUNTER — HOSPITAL ENCOUNTER (OUTPATIENT)
Dept: PHYSICAL THERAPY | Age: 38
Discharge: HOME | End: 2018-01-23
Admitting: ORTHOPAEDIC SURGERY

## 2018-01-23 NOTE — FLOWSHEET NOTE
be achieved in: 2 weeks  1. Independent in HEP and progression per patient tolerance, in order to prevent re-injury. 2. Patient will have a decrease in pain to facilitate improvement in movement, function, and ADLs as indicated by Functional Deficits.     Long Term Goals: To be achieved in: 12 weeks  1. Disability index score of 10% or less for the UEFS to assist with reaching prior level of function. 2. Patient will demonstrate increased AROM to 40IR to allow for proper joint functioning as indicated by patients Functional Deficits. 3. Patient will demonstrate an increase in Strength to good scapular and core control  for UE to allow for proper functional mobility as indicated by patients Functional Deficits. 4. Patient will return to functional activities without increased symptoms or restriction. 5. Patient will perform a full plank for 30\" and perform modified push ups without increased symptoms or restriction. Progression Towards Functional goals:  [x] Patient is progressing as expected towards functional goals listed. [] Progression is slowed due to complexities listed. [] Progression has been slowed due to co-morbidities. [] Plan just implemented, too soon to assess goals progression  [] Other:     ASSESSMENT:   Pt tolerated treatment well with good form presented t/o program despite increased intensity and progressed exercises. Pt did struggle significantly during pushup ups from knees as well as modified planks due to decreased shoulder strength/stability. Required moderate v/c during pushups to keep core firm t/o ROM. Noted significant during increased reps during ER isometric and standing scaption with weight. PT updated HEP and reviewed with pt in regard to progression and implementation; pt verbalized understanding with all questions answered.       Treatment/Activity Tolerance:  [x] Patient tolerated treatment well [] Patient limited by curtis  [] Patient limited by

## 2018-01-29 ENCOUNTER — HOSPITAL ENCOUNTER (OUTPATIENT)
Dept: PHYSICAL THERAPY | Age: 38
Discharge: HOME | End: 2018-01-29
Admitting: ORTHOPAEDIC SURGERY

## 2018-01-29 NOTE — FLOWSHEET NOTE
lifting, house/yardwork, driving/computer work. Therapeutic Activities:    [x] (06286 or 23582) Provided verbal/tactile cueing for activities related to improving balance, coordination, kinesthetic sense, posture, motor skill, proprioception and motor activation to allow for proper function of scapular, scapulothoracic and UE control with self care, carrying, lifting, driving/computer work.      Home Exercise Program:  UPDATED 1/23/18  [x] (18623) Reviewed/Progressed HEP activities related to strengthening, flexibility, endurance, ROM of scapular, scapulothoracic and UE control with self care, reaching, carrying, lifting, house/yardwork, driving/computer work  [] (78180) Reviewed/Progressed HEP activities related to improving balance, coordination, kinesthetic sense, posture, motor skill, proprioception of scapular, scapulothoracic and UE control with self care, reaching, carrying, lifting, house/yardwork, driving/computer work      Manual Treatments:  PROM / STM / Oscillations-Mobs:  G-I, II, III, IV (PA's, Inf., Post.)  [] (75192) Provided manual therapy to mobilize soft tissue/joints of cervical/CT, scapular GHJ and UE for the purpose of modulating pain, promoting relaxation,  increasing ROM, reducing/eliminating soft tissue swelling/inflammation/restriction, improving soft tissue extensibility and allowing for proper ROM for normal function with self care, reaching, carrying, lifting, house/yardwork, driving/computer work    Modalities:      Charges:  Timed Code Treatment Minutes: 60'   Total Treatment Minutes: 72'       [] EVAL (LOW) 70650 (typically 20 minutes face-to-face)  [] EVAL (MOD) 24542 (typically 30 minutes face-to-face)  [] EVAL (HIGH) 12550 (typically 45 minutes face-to-face)  [] RE-EVAL      [x] TW(29507) x  2   [] IONTO  [x] NMR (50320) x  1   [] VASO  [] Manual (96199) x       [] Other:  [x] TA x  1    [] Mech Traction (20342)  [] ES(attended) (67833)      [] ES (un) (16132):

## 2018-02-01 ENCOUNTER — HOSPITAL ENCOUNTER (OUTPATIENT)
Dept: PHYSICAL THERAPY | Age: 38
Discharge: HOME | End: 2018-02-28
Attending: ORTHOPAEDIC SURGERY | Admitting: ORTHOPAEDIC SURGERY

## 2018-02-12 ENCOUNTER — HOSPITAL ENCOUNTER (OUTPATIENT)
Dept: PHYSICAL THERAPY | Age: 38
Discharge: HOME | End: 2018-02-13
Admitting: ORTHOPAEDIC SURGERY

## 2018-02-28 ENCOUNTER — OFFICE VISIT (OUTPATIENT)
Dept: ORTHOPEDIC SURGERY | Age: 38
End: 2018-02-28

## 2018-02-28 VITALS
BODY MASS INDEX: 32.44 KG/M2 | SYSTOLIC BLOOD PRESSURE: 132 MMHG | HEART RATE: 88 BPM | HEIGHT: 64 IN | DIASTOLIC BLOOD PRESSURE: 85 MMHG | WEIGHT: 190 LBS

## 2018-02-28 DIAGNOSIS — M75.111 PARTIAL TEAR OF RIGHT ROTATOR CUFF: Primary | ICD-10-CM

## 2018-02-28 PROCEDURE — 99213 OFFICE O/P EST LOW 20 MIN: CPT | Performed by: PHYSICIAN ASSISTANT

## 2018-02-28 NOTE — LETTER
Physical Therapy Rehabilitation Referral    Patient Name:  King Angel      YOB: 1980    Diagnosis:    1. Partial tear of right rotator cuff        Precautions:     [x] Evaluate and Treat    Post Op Instructions:  [] Continuous passive motion (CPM) [] Elbow ROM  [x] Exercise in plane of scapula  []  Strengthening     [] Pulley and instruction   [x] Home exercise program (copy to patient)   [] Sling when arm at risk  [] Sling or brace at all times   [] AAROM: Forward elevation to  140            [] AAROM: External rotation  To  40    [] Isometric external rotator strengthening [] AAROM: internal rotation: up the back  [] Isometric abductor strengthening  [] AAROM: Internal abduction   [] Isometric internal rotator strengthening [] AAROM: cross-body adduction             Stretching:     Strengthening:  [x] Four quadrant (FE, ER, IR, CBA)  [x] Rotator cuff (ER, IR, Abd)  [] Forward Elevation    [] External Rotators     [] External Rotation    [] Internal Rotators  [] Internal Rotation: up/back   [] Abductors     [] Internal Rotation: supine in abduction [] Flexors  [] Cross-body abduction    [] Extensors  [] Pendulum (FE, Abd/Add, cw/ccw)  [x] Scapular Stabilizers   [] Wall-walking (FE, Abd)        [x] Shoulder shrugs     [] Table slides (FE)                [x] Rhomboid pinch  [] Elbow (flex, ext, pron, sup)        [] Lat.  Pull downs     [] Medial epicondylitis program       [] Forward punch   [] Lateral epicondylitis program       [] Internal rotators     [] Progressive resistive exercises  [] Bench Press        [] Bench press plus  Activities:     [] Lateral pull-downs  [] Rowing     [] Progressive two-hand supine press  [] Stepper/Exercise bike   [] Biceps: curls/supination  [] Swimming  [] Water exercises    Modalities:     Return to Sport:  [x] Of Choice      [] Plyometrics  [] Ultrasound     [] Rhythmic stabilization  [] Iontophoresis    [] Core strengthening

## 2018-03-01 ENCOUNTER — HOSPITAL ENCOUNTER (OUTPATIENT)
Dept: PHYSICAL THERAPY | Age: 38
Discharge: HOME | End: 2018-03-02
Admitting: ORTHOPAEDIC SURGERY

## 2018-03-01 ENCOUNTER — HOSPITAL ENCOUNTER (OUTPATIENT)
Dept: PHYSICAL THERAPY | Age: 38
Discharge: HOME | End: 2018-03-31
Attending: ORTHOPAEDIC SURGERY | Admitting: ORTHOPAEDIC SURGERY

## 2018-03-12 ENCOUNTER — HOSPITAL ENCOUNTER (OUTPATIENT)
Dept: PHYSICAL THERAPY | Age: 38
Discharge: HOME | End: 2018-03-13
Admitting: ORTHOPAEDIC SURGERY

## 2018-03-12 NOTE — FLOWSHEET NOTE
[facility]  Orthopaedics and Sports Rehabilitation, [facility]       Physical Therapy Daily Treatment Note  Date:  3/12/2018    Patient Name:  Ariel Huggins    :  1980  MRN: [id number]  Restrictions/Precautions:    Medical/Treatment Diagnosis Information:  · Diagnosis: M75.111 (ICD-10-CM) - Nontraumatic incomplete rupture of rotator cuff, right  · Treatment Diagnosis: Decreased functional mobility; Decreased ADL status; Decreased ROM; Decreased strength; d/t pain overhead from  possible  RCT  Insurance/Certification information:  PT Insurance Information: PT BENEFITS 2017 FACILITY/ UMR/ EFFECTIVE 17/ ACTIVE/  MET/ OOP 4000/ COPAY 0/ PAYS 80%/ 20 VPCY/ 0 AUTH REQ/ COLLECT 50 FOR EVAL AND 25 FOR FOLLOW UP VISITS/ 17 CB/   Physician Information:  Referring Practitioner: Dr. Dante Mayes of care signed (Y/N):  Yes    Date of Patient follow up with Physician:     G-Code (if applicable):      Date G-Code Applied:  3/12/18  PT G-Codes  Functional Assessment Tool Used: UEFI  Score: 75/80 or 6% deficit  Functional Limitation: Carrying, moving and handling objects  Carrying, Moving and Handling Objects Current Status (): At least 1 percent but less than 20 percent impaired, limited or restricted  Carrying, Moving and Handling Objects Goal Status (): At least 1 percent but less than 20 percent impaired, limited or restricted    Progress Note: [x]  Yes  []  No  Next due by: Visit #10 3/8/18      Latex Allergy:  [x]NO      []YES  Preferred Language for Healthcare:   [x]English       []other:    Visit # Insurance Allowable   11 total  7 in  20     Pain level: 2/10     SUBJECTIVE:  Pt reports soreness for ~24 hours after last tx. Reports compliance with HEP. Difficulty with side plank, but getting easier. Stopped taking antiinflammatory c no c/o increased pain or problems. Notes feeling confident with continuing HEP independently and presents for discharge.       OBJECTIVE: 75/80 or 6% deficit            ROM PROM AROM  Comment     L R L R     Flexion 155 145         Abduction 155 140         ER 95 95         IR 45 38               Strength L R Comment   Flexion 4+ 4+     Abduction 4+ 4+     ER 4 4     IR 4+ 4+           Special Tests Results/Comment   Renan neg   Neers neg   Empty Can neg   Belly Press neg   ER Lag neg   Hornblower neg      Reflexes/Sensation:               [x]Dermatomes/Myotomes intact               [x]Reflexes equal and normal bilaterally              []Other:     Joint mobility:              [x]Normal               []Hypo              []Hyper     Palpation: no ttp    RESTRICTIONS/PRECAUTIONS:     Exercises/Interventions:   Exercises:  Exercise/Equipment Resistance/Repetitions Other comments   Stretching/PROM     Wand        UE Dumas     Pulleys     Pendulum          Isometrics     Retraction          Weight shift     Flexion     Abduction           Biceps     Triceps          PRE's     Flexion 6#, x45 S/L   Abduction 6#, x45 S/L   Flexion 4#, x45 Standing at [place]   External Rotation 6#, x45 S/L   Internal Rotation     Shrugs     EXT     Reverse Flys     Serratus     HABdER 3#, x45    Scaption 3#, x45    Triceps     Retraction          Cable Column/Theraband     External Rotation Black, x45    Internal Rotation     Shrugs     Lats     Ext Black, x45    Flex     Scapular Retraction Silver, x45    BIC     TRIC     PNF          Dynamic Stability     Pushup + 3x10 from knees   Front Plank 3x1' from knees   Side Plank 3x30\" from knees   Cw/CCw   Large at side   BodyBlade 2 way 3x30\" Small, Left/Right, Up/Down        Plyoback          Manual interventions                 Therapeutic Exercise and NMR EXR  [x] (19278) Provided verbal/tactile cueing for activities related to strengthening, flexibility, endurance, ROM  for improvements in scapular, scapulothoracic and UE control with self care, reaching, carrying, lifting, house/yardwork, driving/computer indpt with HEP and demonstrated increased R shoulder IR from 31 to 38 degrees without pain. Increase UEFI significantly from 24% to 6% deficit. Pt also met self goal to return to functional activities and exercise classes without increase sx's. Overall, pt feels confident in self managing care with HEP. PT reviewed independent HEP with pt in regard to progression and implementation; pt verbalized understanding with all questions answered. Treatment/Activity Tolerance:  [x] Patient tolerated treatment well [] Patient limited by fatique  [] Patient limited by pain  [] Patient limited by other medical complications  [] Other:     Patient education: Pt reviewed HEP and POC with pt; pt agreed with all questions answered. Pt to call PT with any questions    Prognosis: [x] Good [] Fair  [] Poor    Patient Requires Follow-up: [] Yes  [x] No    PLAN: See eval  [] Continue per plan of care [] Alter current plan (see comments)  [] Plan of care initiated [] Hold pending MD visit [x] Discharge    Electronically signed by: Gene Haywood PT, DPT  Anny Avery, [facility] Therapist was present, directed the patient's care, made skilled judgement, and was  responsible for assessment and treatment of the patient. Derrick Grace. Aleen Boeck, [place], [id number]  Physical Therapist  [email or web address]

## 2018-03-15 NOTE — PROGRESS NOTES
History of Present Illness:  Jayshree Najera is a 29-year-old female here for follow-up regarding her right shoulder. She was diagnosed with a high-grade partial-thickness rotator cuff tear of the right shoulder. She hasn't started on conservative management. She's been taking meloxicam and has been doing physical therapy and feels that his been helpful overall. She denies any new injury since her last visit. She has been doing her physical therapy at the [facility] location. Medical History:  Patient's medications, allergies, past medical, surgical, social and family histories were reviewed and updated as appropriate. ROS  NO CHANGE IN MEDICAL STATUS    Review of Systems  A 14 point review of systems was completed by the patient and is available in the media section of the scanned medical record and was reviewed on 3/15/2018. The review is negative with the exception of those things mentioned in the HPI and Past Medical History    Vital Signs:  Vitals:    02/28/18 1721   BP: 132/85   Pulse: 88       General/Appearance: Alert and oriented and in no apparent distress. Skin:  There are no skin lesions, cellulitis, or extreme edema. The patient has warm and well-perfused Bilateral upper extremities with brisk capillary refill. Left Shoulder Exam:  Inspection: No effusion, No gross deformities, no signs of infection. Palpation:  He has mild tightness over the rotator cuff. There is no crepitus. Active Range of Motion: Forward elevation of 160°, abduction 160°, external rotation 80° and internal rotation to the back is at the lower elastic region. Passive Range of Motion:  Similar to active    Strength:  5/5 external/internal rotation with resistance, 4+/5 supraspinatus    Special Tests:   No Tristin muscle deformity.     Neurovascular: Sensation to light touch is intact, no motor deficits, palpable radial pulses 2+           Assessment :  Ms. Jayshree Najera is a 30 y.o. yr old patient with a focal high-grade partial-thickness tear with impingement of the right shoulder. She has been started on conservative management and is responding well. Impression:  Encounter Diagnosis   Name Primary?  Partial tear of right rotator cuff Yes       Office Procedures:  No orders of the defined types were placed in this encounter. Treatment Plan: At this time she continues to respond well to physical therapy. Recommended that she continue that. We also recommend that she continue take her anti-inflammatories only on an as-needed basis. Should she have any questions or concerns she can certainly follow back sooner otherwise we will see her back in 3 weeks for follow-up visit. We will go ahead and renew her therapy visits. 4:08 PM      Duane Atkinson PA-C  Orthopaedic Sports Medicine Physician Assistant    This dictation was performed with a verbal recognition program [facility]) and it was checked for errors. It is possible that there are still dictated errors within this office note. If so, please bring any errors to my attention for an addendum. All efforts were made to ensure that this office note is accurate.

## 2018-03-23 DIAGNOSIS — S46.111A LABRAL TEAR OF LONG HEAD OF BICEPS TENDON, RIGHT, INITIAL ENCOUNTER: ICD-10-CM

## 2018-03-23 RX ORDER — MELOXICAM 15 MG/1
15 TABLET ORAL DAILY
Qty: 30 TABLET | Refills: 3 | Status: SHIPPED | OUTPATIENT
Start: 2018-03-23 | End: 2018-05-02

## 2018-04-01 ENCOUNTER — HOSPITAL ENCOUNTER (OUTPATIENT)
Dept: PHYSICAL THERAPY | Age: 38
Discharge: HOME | End: 2018-04-30
Attending: ORTHOPAEDIC SURGERY | Admitting: ORTHOPAEDIC SURGERY

## 2018-05-02 ENCOUNTER — OFFICE VISIT (OUTPATIENT)
Dept: DERMATOLOGY | Age: 38
End: 2018-05-02

## 2018-05-02 DIAGNOSIS — D22.9 BENIGN NEVUS: ICD-10-CM

## 2018-05-02 DIAGNOSIS — L71.9 ROSACEA: Primary | ICD-10-CM

## 2018-05-02 DIAGNOSIS — Z86.018 HISTORY OF DYSPLASTIC NEVUS: ICD-10-CM

## 2018-05-02 PROCEDURE — 99213 OFFICE O/P EST LOW 20 MIN: CPT | Performed by: DERMATOLOGY

## 2019-05-16 ENCOUNTER — TELEPHONE (OUTPATIENT)
Dept: DERMATOLOGY | Age: 39
End: 2019-05-16

## 2019-05-16 NOTE — TELEPHONE ENCOUNTER
Voicemail received 5/17/19 at 12:50 pm    Patient has a couple spots that she's concerned about and forgot to make her summer 2019 appointment.     Please advise patient at

## 2019-05-23 ENCOUNTER — OFFICE VISIT (OUTPATIENT)
Dept: DERMATOLOGY | Age: 39
End: 2019-05-23
Payer: COMMERCIAL

## 2019-05-23 DIAGNOSIS — L91.8 INFLAMED SKIN TAG: Primary | ICD-10-CM

## 2019-05-23 DIAGNOSIS — L24.9 IRRITANT DERMATITIS: ICD-10-CM

## 2019-05-23 DIAGNOSIS — Z86.018 HISTORY OF DYSPLASTIC NEVUS: ICD-10-CM

## 2019-05-23 DIAGNOSIS — D22.9 BENIGN NEVUS: ICD-10-CM

## 2019-05-23 PROCEDURE — 11200 RMVL SKIN TAGS UP TO&INC 15: CPT | Performed by: DERMATOLOGY

## 2019-05-23 PROCEDURE — 99214 OFFICE O/P EST MOD 30 MIN: CPT | Performed by: DERMATOLOGY

## 2019-05-23 RX ORDER — FEXOFENADINE HCL 180 MG/1
180 TABLET ORAL
COMMUNITY

## 2019-05-23 RX ORDER — METHYLPHENIDATE HYDROCHLORIDE 10 MG/1
TABLET ORAL
COMMUNITY
Start: 2019-05-20

## 2019-05-23 NOTE — PROGRESS NOTES
[facility]) Dermatology  Amita Clarke M.D.  [phone]       Lela Hernandez  1980    41 y.o. female     Date of Visit: 5/23/2019    Chief Complaint:   Chief Complaint   Patient presents with    Skin Lesion        I was asked to see this patient by Dr. Bajwa ref. provider found. History of Present Illness:  1. Total body skin exam    6 months postpartum-not breast-feeding. Son named Sera Joy    Two new skin tags on her thighs-developed during pregnancy. Become traumatized and irritated when she walks    New nevus right anterior shoulder. Developed during pregnancy. Now stable in size, shape, color. Asymptomatic    Noticed scale right posterior lower leg-present for weeks. Not itching, bleeding. Skin history:  No PH skin ca. 6/16 left inferior buttock dysplastic nevus with mild dysplasia completely excised     Seb derm- nizoral cream (not using)     Rosacea-MetroGel 0.75% (not using)     + Mother with many NMSC- sees Reji Eden      Review of Systems:  Constitutional: Reports general sense of well-being   New nevus right shoulder, new rash right posterior lower leg    Past Medical History, Surgical History, Family History, Medications and Allergies reviewed.     Social History:   Social History     Socioeconomic History    Marital status:      Spouse name: Not on file    Number of children: Not on file    Years of education: Not on file    Highest education level: Not on file   Occupational History    Not on file   Social Needs    Financial resource strain: Not on file    Food insecurity:     Worry: Not on file     Inability: Not on file    Transportation needs:     Medical: Not on file     Non-medical: Not on file   Tobacco Use    Smoking status: Never Smoker    Smokeless tobacco: Never Used   Substance and Sexual Activity    Alcohol use: No    Drug use: No    Sexual activity: Not on file   Lifestyle    Physical activity:     Days per week: Not on file     Minutes per hours), avoidance of tanning beds   Monitor new nevus right anterior shoulder for change    3. Irritant dermatitis / xerosis-moisturize. She will let me know if this doesn't completely resolve    4.  History of dysplastic nevus -monitor for new or changing pigmented lesion

## 2020-06-11 ENCOUNTER — OFFICE VISIT (OUTPATIENT)
Dept: DERMATOLOGY | Age: 40
End: 2020-06-11
Payer: COMMERCIAL

## 2020-06-11 VITALS — TEMPERATURE: 97.3 F

## 2020-06-11 PROCEDURE — 12031 INTMD RPR S/A/T/EXT 2.5 CM/<: CPT | Performed by: DERMATOLOGY

## 2020-06-11 PROCEDURE — 99213 OFFICE O/P EST LOW 20 MIN: CPT | Performed by: DERMATOLOGY

## 2020-06-11 PROCEDURE — 11401 EXC TR-EXT B9+MARG 0.6-1 CM: CPT | Performed by: DERMATOLOGY

## 2020-06-11 RX ORDER — DROSPIRENONE AND ETHINYL ESTRADIOL 0.02-3(28)
1 KIT ORAL DAILY
COMMUNITY
End: 2021-01-11

## 2020-06-22 ENCOUNTER — TELEPHONE (OUTPATIENT)
Dept: DERMATOLOGY | Age: 40
End: 2020-06-22

## 2020-06-23 ENCOUNTER — NURSE ONLY (OUTPATIENT)
Dept: DERMATOLOGY | Age: 40
End: 2020-06-23

## 2020-06-23 VITALS — TEMPERATURE: 97.3 F

## 2020-06-23 PROCEDURE — 99024 POSTOP FOLLOW-UP VISIT: CPT | Performed by: DERMATOLOGY

## 2020-06-26 LAB — DERMATOLOGY PATHOLOGY REPORT: NORMAL

## 2020-07-17 ENCOUNTER — OFFICE VISIT (OUTPATIENT)
Dept: PRIMARY CARE CLINIC | Age: 40
End: 2020-07-17
Payer: COMMERCIAL

## 2020-07-17 PROCEDURE — 99211 OFF/OP EST MAY X REQ PHY/QHP: CPT | Performed by: NURSE PRACTITIONER

## 2020-07-17 NOTE — PROGRESS NOTES
Christie Herndon received a viral test for COVID-19. They were educated on isolation and quarantine as appropriate. For any symptoms, they were directed to seek care from their PCP, given contact information to establish with a doctor, directed to an urgent care or the emergency room.

## 2020-07-23 LAB
SARS-COV-2: NOT DETECTED
SOURCE: NORMAL

## 2020-07-29 ENCOUNTER — PROCEDURE VISIT (OUTPATIENT)
Dept: DERMATOLOGY | Age: 40
End: 2020-07-29
Payer: COMMERCIAL

## 2020-07-29 VITALS — TEMPERATURE: 97.9 F

## 2020-07-29 PROCEDURE — 12031 INTMD RPR S/A/T/EXT 2.5 CM/<: CPT | Performed by: DERMATOLOGY

## 2020-07-29 PROCEDURE — 11401 EXC TR-EXT B9+MARG 0.6-1 CM: CPT | Performed by: DERMATOLOGY

## 2020-07-30 NOTE — PROGRESS NOTES
Called and LM for pt to call the office to schedule 6 month skin exam.
violence     Fear of current or ex partner: Not on file     Emotionally abused: Not on file     Physically abused: Not on file     Forced sexual activity: Not on file   Other Topics Concern    Not on file   Social History Narrative    Not on file       Physical Examination       -General: Well-appearing, NAD  1. Well-developed well-nourished. Well-healed scar right anterior shoulder with no residual pigment. Site confirmed with patient and prior picture    Assessment and Plan     1. Spitz nevus of right shoulder-severely atypical-recommended excision with an additional 3-4 mm margin around visible scar.    -Informed written consent obtained after risks (bleeding, infection, scar, discomfort)/benefits explained. -Area prepped/draped in sterile fashion. Local anesthesia achieved with 1% lidocaine w/ epinephrine/sodium bicarbonate. Elliptical excision to superficial subcutaneous fat performed. Specimen sent to pathology. Edges undermined and hemostasis achieved w/ electrodessication. Layered closure with 4-0 deep vicryl sutures and 5-0 running Prolene sutures along relaxed skin tension lines to preserve anatomic function and enhance wound healing.   -Width of lesion w/ 3-4 mm margins - 8 cm; length of repair 2.4 cm.   -Edu re: wound care, suture removal     Sutures out 12 days.   Counseled regarding signs and symptoms of infection

## 2020-08-03 LAB — DERMATOLOGY PATHOLOGY REPORT: NORMAL

## 2020-08-10 ENCOUNTER — NURSE ONLY (OUTPATIENT)
Dept: DERMATOLOGY | Age: 40
End: 2020-08-10

## 2020-08-10 VITALS — TEMPERATURE: 97.2 F

## 2020-08-10 PROCEDURE — 99024 POSTOP FOLLOW-UP VISIT: CPT | Performed by: DERMATOLOGY

## 2020-12-22 ENCOUNTER — OFFICE VISIT (OUTPATIENT)
Dept: PRIMARY CARE CLINIC | Age: 40
End: 2020-12-22
Payer: COMMERCIAL

## 2020-12-22 PROCEDURE — 99211 OFF/OP EST MAY X REQ PHY/QHP: CPT | Performed by: NURSE PRACTITIONER

## 2020-12-22 NOTE — PROGRESS NOTES
Tricia Yaniv received a viral test for COVID-19. They were educated on isolation and quarantine as appropriate. For any symptoms, they were directed to seek care from their PCP, given contact information to establish with a doctor, directed to an urgent care or the emergency room.

## 2020-12-23 LAB — SARS-COV-2, NAA: NOT DETECTED

## 2021-01-11 ENCOUNTER — OFFICE VISIT (OUTPATIENT)
Dept: DERMATOLOGY | Age: 41
End: 2021-01-11
Payer: COMMERCIAL

## 2021-01-11 VITALS — TEMPERATURE: 97.5 F

## 2021-01-11 DIAGNOSIS — L71.9 ROSACEA: ICD-10-CM

## 2021-01-11 DIAGNOSIS — Z86.018 HISTORY OF DYSPLASTIC NEVUS: ICD-10-CM

## 2021-01-11 DIAGNOSIS — L57.8 DIFFUSE PHOTODAMAGE OF SKIN: ICD-10-CM

## 2021-01-11 DIAGNOSIS — D22.9 BENIGN NEVUS: Primary | ICD-10-CM

## 2021-01-11 PROCEDURE — 99214 OFFICE O/P EST MOD 30 MIN: CPT | Performed by: DERMATOLOGY

## 2021-01-11 NOTE — PROGRESS NOTES
[facility]) Dermatology  Jerald Diggs M.D.  [phone]       Jacey Vazquez  1980    47 y.o. female     Date of Visit: 1/11/2021    Chief Complaint:   Chief Complaint   Patient presents with    Skin Lesion        I was asked to see this patient by Dr. Bajwa ref. provider found. History of Present Illness:  1. Total-body skin exam    Multiple nevi. Stable in size, shape, color. Has not noticed any new or changing pigmented lesion    Photodamage-mild tan chest-was in [place] after [place]. Very careful to wear sunscreen. Is wearing a hat more regularly. Vascular rosacea-progressive erythema with telangiectasias both cheeks, chin, nose. No inflammatory papules. Healed well after excision of Spitz nevus right anterior shoulder. Has not developed a keloid. Skin history:  No PH skin ca. 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     Seb derm- nizoral cream (not using)     Rosacea-MetroGel 0.75% (not using)     + Mother with many NMSC- sees Reji Eden       Review of Systems:  Constitutional: Reports general sense of well-being       Past Medical History, Surgical History, Family History, Medications and Allergies reviewed.     Social History:   Social History     Socioeconomic History    Marital status:      Spouse name: Not on file    Number of children: Not on file    Years of education: Not on file    Highest education level: Not on file   Occupational History    Not on file   Social Needs    Financial resource strain: Not on file    Food insecurity     Worry: Not on file     Inability: Not on file    Transportation needs     Medical: Not on file     Non-medical: Not on file   Tobacco Use    Smoking status: Never Smoker    Smokeless tobacco: Never Used   Substance and Sexual Activity    Alcohol use: No    Drug use: No    Sexual activity: Not on file   Lifestyle    Physical activity     Days per week: Not on file     Minutes per session: Not on file    Stress: Not on file   Relationships    Social connections     Talks on phone: Not on file     Gets together: Not on file     Attends Religion service: Not on file     Active member of club or organization: Not on file     Attends meetings of clubs or organizations: Not on file     Relationship status: Not on file    Intimate partner violence     Fear of current or ex partner: Not on file     Emotionally abused: Not on file     Physically abused: Not on file     Forced sexual activity: Not on file   Other Topics Concern    Not on file   Social History Narrative    Not on file       Physical Examination       -General: Well-appearing, NAD  1. Normal affect. Total body skin exam including scalp, face, neck, chest, abdomen, back, bilateral upper extremities, bilateral lower extremities, ocular conjunctiva, external lips, and nails was performed. Examination normal unless stated below. Underwear area not examined. Scattered on the trunk and extremities are multiple well-defined round and oval symmetric smoothly-bordered uniformly brown macules and papules. Freckling and lentigines of sun exposed areas. Mild residual tan chest and legs  Well-healed scar at site of prior dysplastic nevus  Well-healed scar at site of prior Spitz nevus-erythema of scar but no keloid or hypertrophic scar  Background erythema with telangiectasias cheeks, chin          Assessment and Plan     1.  Benign nevus - Benign acquired melanocytic nevi  -Recommend monthly self skin exams   -Educated regarding the ABCDEs of melanoma detection   -Call for any new/changing moles or concerning lesions  -Reviewed sun protective behavior -- sun avoidance during the peak hours of the day, sun-protective clothing (including hat and sunglasses), sunscreen use (water resistant, broad spectrum, SPF at least 30, need for reapplication every 2 to 3 hours), avoidance of tanning beds      2. Rosacea-discussed treatment options with patient. Consider laser. Discussed laser with Tanisha Roberto   3. Diffuse photodamage of skin-hats, sunscreen, sun avoidance. Discussed sunscreen as last line of defense-shade, altering time spent in sun   4. History of dysplastic nevus-monitor for change.   Patient aware of increased risk of malignancy

## 2021-01-13 ENCOUNTER — TELEPHONE (OUTPATIENT)
Dept: DERMATOLOGY | Age: 41
End: 2021-01-13

## 2021-01-18 NOTE — TELEPHONE ENCOUNTER
Patient notified that clearance rate can vary % depending on person/broken capillaries and to keep up with maintenance. And persistent redness  requires more frequent treatments. Patient will return call when she reviews everything.

## 2021-01-18 NOTE — TELEPHONE ENCOUNTER
Usually $200-250/treatment for ~ 3 treatments spaced about 4-6 weeks apart. Risk of swelling and looking redder temporarily for a few days after each treatment and some risk of focal small bruising for a few days after but no scabs or open areas or other wounds to take care of with treatments.

## 2021-07-14 ENCOUNTER — OFFICE VISIT (OUTPATIENT)
Dept: DERMATOLOGY | Age: 41
End: 2021-07-14
Payer: COMMERCIAL

## 2021-07-14 VITALS — TEMPERATURE: 98 F

## 2021-07-14 DIAGNOSIS — L71.9 ROSACEA: ICD-10-CM

## 2021-07-14 DIAGNOSIS — Z86.018 HISTORY OF DYSPLASTIC NEVUS: ICD-10-CM

## 2021-07-14 DIAGNOSIS — D22.9 BENIGN NEVUS: ICD-10-CM

## 2021-07-14 DIAGNOSIS — D22.39 FIBROUS PAPULE OF NOSE: ICD-10-CM

## 2021-07-14 DIAGNOSIS — L70.0 COMEDONE: Primary | ICD-10-CM

## 2021-07-14 PROCEDURE — 99213 OFFICE O/P EST LOW 20 MIN: CPT | Performed by: DERMATOLOGY

## 2021-07-14 NOTE — PROGRESS NOTES
[facility]) Dermatology  Denilson Prajapati M.D.  [phone]       Carlos Dumont  1980    47 y.o. female     Date of Visit: 7/14/2021    Chief Complaint:   Chief Complaint   Patient presents with    Skin Exam     6 mo FSE           HX:NMSC,nevus        I was asked to see this patient by Dr. Bajwa ref. provider found. History of Present Illness:  1. Total-body skin exam    Multiple nevi-stable in size, shape, color. Has not noticed any new or changing nevi. Does monitor her skin for change    New 1 mm brown papule right malar cheek. Has not increased in size. Asymptomatic-not itching, bleeding. Vascular rosacea-considering laser    Raised papule right nasal tip-not itching, bleeding. Asymptomatic. Skin history:  No PH skin ca. 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      Seb derm- nizoral cream (not using)     Rosacea-MetroGel 0.75% (not using)     + Mother with many NMSC    Review of Systems:  Constitutional: Reports general sense of well-being       Past Medical History, Surgical History, Family History, Medications and Allergies reviewed.     Social History:   Social History     Socioeconomic History    Marital status:      Spouse name: Not on file    Number of children: Not on file    Years of education: Not on file    Highest education level: Not on file   Occupational History    Not on file   Tobacco Use    Smoking status: Never Smoker    Smokeless tobacco: Never Used   Vaping Use    Vaping Use: Never used   Substance and Sexual Activity    Alcohol use: No    Drug use: No    Sexual activity: Not on file   Other Topics Concern    Not on file   Social History Narrative    Not on file     Social Determinants of Health     Financial Resource Strain:     Difficulty of Paying Living Expenses:    Food Insecurity:     Worried About Running Out of Food in the Last Year:    [address] in the Last Year:    Transportation Needs:     Lack of Transportation (Medical):  Lack of Transportation (Non-Medical):    Physical Activity:     Days of Exercise per Week:     Minutes of Exercise per Session:    Stress:     Feeling of Stress :    Social Connections:     Frequency of Communication with Friends and Family:     Frequency of Social Gatherings with Friends and Family:     Attends Hoahaoism Services:     Active Member of Clubs or Organizations:     Attends Club or Organization Meetings:     Marital Status:    Intimate Partner Violence:     Fear of Current or Ex-Partner:     Emotionally Abused:     Physically Abused:     Sexually Abused:        Physical Examination       -General: Well-appearing, NAD  1. Normal affect. Total body skin exam including scalp, face, neck, chest, abdomen, back, bilateral upper extremities, bilateral lower extremities, ocular conjunctiva, external lips, and nails was performed. Examination normal unless stated below. Underwear area not examined. Scattered on the trunk and extremities are multiple well-defined round and oval symmetric smoothly-bordered uniformly brown macules and papules. 2 mm pink papule right nasal tip with intact skin markings  Background erythema of nose, cheeks  1 mm open comedone right malar cheek  Well-healed scar at site of prior Spitz nevus no sign of recurrence      Assessment and Plan     1. Comedone-after the risk, complications, alternatives were explained with patient informed consent was obtained. Lesion was unroofed and extracted. Discussed risk of recurrence    No charge   2.  Benign nevus - Benign acquired melanocytic nevi  -Recommend monthly self skin exams   -Educated regarding the ABCDEs of melanoma detection   -Call for any new/changing moles or concerning lesions  -Reviewed sun protective behavior -- sun avoidance during the peak hours of the day, sun-protective clothing (including hat and sunglasses), sunscreen use (water resistant, broad spectrum, SPF at least 30, need for reapplication every 2 to 3 hours), avoidance of tanning beds      3. Rosacea-laser as needed-Ayesha Santiago   4. Fibrous papule of nose-shave removal through plastics if patient wishes-discussed differential diagnosis including early basal cell carcinoma. If she decides not to proceed with removal, continue to monitor. Biopsy for any change or growth   5.  History of dysplastic nevus-monitor for new or changing pigmented lesions

## 2022-01-25 ENCOUNTER — OFFICE VISIT (OUTPATIENT)
Dept: DERMATOLOGY | Age: 42
End: 2022-01-25
Payer: COMMERCIAL

## 2022-01-25 VITALS — TEMPERATURE: 97.2 F

## 2022-01-25 DIAGNOSIS — D22.9 BENIGN NEVUS: ICD-10-CM

## 2022-01-25 DIAGNOSIS — D48.5 NEOPLASM OF UNCERTAIN BEHAVIOR OF SKIN: ICD-10-CM

## 2022-01-25 DIAGNOSIS — L71.9 ROSACEA: ICD-10-CM

## 2022-01-25 DIAGNOSIS — L70.0 COMEDONE: Primary | ICD-10-CM

## 2022-01-25 DIAGNOSIS — Z86.018 HISTORY OF DYSPLASTIC NEVUS: ICD-10-CM

## 2022-01-25 PROCEDURE — 99213 OFFICE O/P EST LOW 20 MIN: CPT | Performed by: DERMATOLOGY

## 2022-01-25 RX ORDER — FLUCONAZOLE 150 MG/1
150 TABLET ORAL DAILY
COMMUNITY
Start: 2022-01-24 | End: 2022-01-25

## 2022-01-25 RX ORDER — AZITHROMYCIN 250 MG/1
TABLET, FILM COATED ORAL
COMMUNITY
Start: 2022-01-24 | End: 2022-02-10

## 2022-01-25 RX ORDER — ERGOCALCIFEROL 1.25 MG/1
CAPSULE ORAL
COMMUNITY
Start: 2021-11-15

## 2022-01-25 NOTE — PROGRESS NOTES
[facility]) Dermatology  Elliott Zayas M.D.  [phone]       Pina Plata  1980    46 y.o. female     Date of Visit: 1/25/2022    Chief Complaint:   Chief Complaint   Patient presents with    Skin Lesion        I was asked to see this patient by Dr. Bajwa ref. provider found. History of Present Illness:  1. Total-body skin exam    Multiple nevi-stable in size, shape, color. Has not noticed any new or changing pigmented lesions. New pink papule right nasal tip-clinically consistent with fibrous papule, could not rule out basal cell carcinoma. Patient prefers to proceed with biopsy-leaving for the [facility] next week, will defer until she returns. Lesion not increasing in size, not itching, bleeding. Comedone right malar cheek-cleared after her last visit, has recurred. Cosmetically bothersome. Sometimes becomes traumatized-she scratches at central debris. Vascular rosacea-does not develop inflammatory papules. Minimally bothersome.     Skin history:  No PH skin ca. 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      Seb derm- nizoral cream (not using)     Rosacea-MetroGel 0.75% (not using)     + Mother with many NMSC    Review of Systems:  Constitutional: Reports general sense of well-being       Past Medical History, Surgical History, Family History, Medications and Allergies reviewed.     Social History:   Social History     Socioeconomic History    Marital status:      Spouse name: Not on file    Number of children: Not on file    Years of education: Not on file    Highest education level: Not on file   Occupational History    Not on file   Tobacco Use    Smoking status: Never Smoker    Smokeless tobacco: Never Used   Vaping Use    Vaping Use: Never used   Substance and Sexual Activity    Alcohol use: No    Drug use: No    Sexual activity: Not on file Other Topics Concern    Not on file   Social History Narrative    Not on file     Social Determinants of Health     Financial Resource Strain:     Difficulty of Paying Living Expenses: Not on file   Food Insecurity:     Worried About Running Out of Food in the Last Year: Not on file    Bryson of Food in the Last Year: Not on file   Transportation Needs:     Lack of Transportation (Medical): Not on file    Lack of Transportation (Non-Medical): Not on file   Physical Activity:     Days of Exercise per Week: Not on file    Minutes of Exercise per Session: Not on file   Stress:     Feeling of Stress : Not on file   Social Connections:     Frequency of Communication with Friends and Family: Not on file    Frequency of Social Gatherings with Friends and Family: Not on file    Attends Restoration Services: Not on file    Active Member of [address] [place] [address] or Organizations: Not on file    Attends Club or Organization Meetings: Not on file    Marital Status: Not on file   Intimate Partner Violence:     Fear of Current or Ex-Partner: Not on file    Emotionally Abused: Not on file    Physically Abused: Not on file    Sexually Abused: Not on file   Housing Stability:     Unable to Pay for Housing in the Last Year: Not on file    Number of [place] in the Last Year: Not on file    Unstable Housing in the Last Year: Not on file       Physical Examination       -General: Well-appearing, NAD  1. Normal affect. Total body skin exam including scalp, face, neck, chest, abdomen, back, bilateral upper extremities, bilateral lower extremities, ocular conjunctiva, external lips, and nails was performed. Examination normal unless stated below. Underwear area not examined. Scattered on the trunk and extremities are multiple well-defined round and oval symmetric smoothly-bordered uniformly brown macules and papules.   1 mm comedone right lateral malar cheek with central debris  Background erythema with telangiectasias

## 2022-02-10 ENCOUNTER — PROCEDURE VISIT (OUTPATIENT)
Dept: DERMATOLOGY | Age: 42
End: 2022-02-10
Payer: COMMERCIAL

## 2022-02-10 VITALS — TEMPERATURE: 97.6 F

## 2022-02-10 DIAGNOSIS — D48.5 NEOPLASM OF UNCERTAIN BEHAVIOR OF SKIN: Primary | ICD-10-CM

## 2022-02-10 PROCEDURE — 11102 TANGNTL BX SKIN SINGLE LES: CPT | Performed by: DERMATOLOGY

## 2022-02-14 LAB — DERMATOLOGY PATHOLOGY REPORT: NORMAL

## 2022-08-16 ENCOUNTER — OFFICE VISIT (OUTPATIENT)
Dept: DERMATOLOGY | Age: 42
End: 2022-08-16
Payer: COMMERCIAL

## 2022-08-16 DIAGNOSIS — Z86.018 HISTORY OF DYSPLASTIC NEVUS: ICD-10-CM

## 2022-08-16 DIAGNOSIS — L57.8 DIFFUSE PHOTODAMAGE OF SKIN: ICD-10-CM

## 2022-08-16 DIAGNOSIS — L91.8 INFLAMED SKIN TAG: ICD-10-CM

## 2022-08-16 DIAGNOSIS — D22.9 BENIGN NEVUS: ICD-10-CM

## 2022-08-16 DIAGNOSIS — D48.5 NEOPLASM OF UNCERTAIN BEHAVIOR OF SKIN: Primary | ICD-10-CM

## 2022-08-16 PROCEDURE — 11102 TANGNTL BX SKIN SINGLE LES: CPT | Performed by: DERMATOLOGY

## 2022-08-16 PROCEDURE — 11200 RMVL SKIN TAGS UP TO&INC 15: CPT | Performed by: DERMATOLOGY

## 2022-08-16 PROCEDURE — 99213 OFFICE O/P EST LOW 20 MIN: CPT | Performed by: DERMATOLOGY

## 2022-08-16 NOTE — PROGRESS NOTES
[facility]) Dermatology  Adan Leach M.D.  [phone]       Richard Heads  1980    46 y.o. female     Date of Visit: 8/16/2022    Chief Complaint:   Chief Complaint   Patient presents with    Skin Lesion        I was asked to see this patient by Dr. Bajwa ref. provider found. History of Present Illness:  1. Total-body skin exam    Inflamed skin tag left neck-rubs on her necklace and becomes traumatized. Would like this removed. New or newly enlarged nevus base of scar right anterior shoulder from removal of severely atypical Spitz. Not itching, bleeding. Asymptomatic    Multiple nevi. Stable in size, shape, color. Has not noticed any new or changing pigmented lesions    Photodamage-progressive freckling and lentigines of sun exposed areas. Very careful to wear hats and sunscreen regularly      Skin history:  No PH skin ca. 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     Social History:   Social History     Socioeconomic History    Marital status:      Spouse name: Not on file    Number of children: Not on file    Years of education: Not on file    Highest education level: Not on file   Occupational History    Not on file   Tobacco Use    Smoking status: Never    Smokeless tobacco: Never   Vaping Use    Vaping Use: Never used   Substance and Sexual Activity    Alcohol use: No    Drug use: No    Sexual activity: Not on file   Other Topics Concern    Not on file   Social History Narrative    Not on file     Social Determinants of Health     Financial Resource Strain: Not on file   Food Insecurity: Not on file   Transportation Needs: Not on file   Physical Activity: Not on file   Stress: Not on file   Social Connections: Not on file   Intimate Partner Violence: Not on file   Housing Stability: Not on file       Physical Examination       -General: Well-appearing, NAD  1. Normal affect. Total body skin exam including scalp, face, neck, chest, abdomen, back, bilateral upper extremities, bilateral lower extremities, ocular conjunctiva, external lips, and nails was performed. Examination normal unless stated below. Underwear area not examined. Scattered on the trunk and extremities are multiple well-defined round and oval symmetric smoothly-bordered uniformly brown macules and papules. Diffuse background photodamage with freckling and lentigines    3 mm pedunculated flesh-colored papule left mid neck    3 mm brown, erythematous papule at base of scar right anterior shoulder              Assessment and Plan     1. Neoplasm of uncertain behavior of skin-right anterior shoulder at base of scar--Discussed possible diagnosis. Patient agreeable to biopsy. Verbal consent obtained after risks (infection, bleeding, scar), benefits and alternatives explained. -Area(s) to be biopsied were marked with a surgical pen. Site(s) were cleansed with alcohol. Local anesthesia achieved with 1% lidocaine with epinephrine/sodium bicarbonate. Shave biopsy performed with a razor blade. Hemostasis was achieved with aluminum chloride. The wound(s) were dressed with petrolatum and covered with a bandage. Specimen(s) sent to pathology. Pt educated re: risk of bleeding, infection, scar and wound care instructions. 2. Inflamed skin tag - -Verbal consent obtained after risks (infection, bleeding, scar), benefits and alternatives explained. -Area(s) to be shaved were marked with a surgical pen. Site(s) were cleansed with alcohol.  Local anesthesia achieved with 1% lidocaine with epinephrine/sodium bicarbonate. Shave lesion performed with a razor blade. Hemostasis was achieved with aluminum chloride. The wound(s) were dressed with petrolatum and covered with a bandage. Specimen(s) sent to pathology. Pt educated re: risk of bleeding, infection, scar and wound care instructions. 3. Benign nevus - Benign acquired melanocytic nevi  -Recommend monthly self skin exams   -Educated regarding the ABCDEs of melanoma detection   -Call for any new/changing moles or concerning lesions  -Reviewed sun protective behavior -- sun avoidance during the peak hours of the day, sun-protective clothing (including hat and sunglasses), sunscreen use (water resistant, broad spectrum, SPF at least 30, need for reapplication every 2 to 3 hours), avoidance of tanning beds      4. Diffuse photodamage of skin - Discussed changes in skin from chronic UV exposure and ways to mitigate further damage- hats when outside, SPF 50 or higher that is reapplied regularly, daily SPF application, UV protective clothing, and sun avoidance.      5. History of dysplastic nevus-monitor for new or changing pigmented lesions

## 2022-08-18 LAB — DERMATOLOGY PATHOLOGY REPORT: NORMAL

## 2022-10-03 RX ORDER — CLOBETASOL PROPIONATE 0.5 MG/G
OINTMENT TOPICAL
Qty: 30 G | Refills: 0 | Status: SHIPPED | OUTPATIENT
Start: 2022-10-03

## 2023-01-17 ENCOUNTER — OFFICE VISIT (OUTPATIENT)
Dept: DERMATOLOGY | Age: 43
End: 2023-01-17
Payer: COMMERCIAL

## 2023-01-17 DIAGNOSIS — L82.0 SEBORRHEIC KERATOSES, INFLAMED: ICD-10-CM

## 2023-01-17 DIAGNOSIS — L23.0 ALLERGIC CONTACT DERMATITIS DUE TO METALS: ICD-10-CM

## 2023-01-17 DIAGNOSIS — Z86.018 HISTORY OF DYSPLASTIC NEVUS: ICD-10-CM

## 2023-01-17 DIAGNOSIS — L91.8 INFLAMED SKIN TAG: Primary | ICD-10-CM

## 2023-01-17 DIAGNOSIS — L57.8 DIFFUSE PHOTODAMAGE OF SKIN: ICD-10-CM

## 2023-01-17 PROCEDURE — 99214 OFFICE O/P EST MOD 30 MIN: CPT | Performed by: DERMATOLOGY

## 2023-01-17 PROCEDURE — 17110 DESTRUCTION B9 LES UP TO 14: CPT | Performed by: DERMATOLOGY

## 2023-01-17 PROCEDURE — 11200 RMVL SKIN TAGS UP TO&INC 15: CPT | Performed by: DERMATOLOGY

## 2023-01-17 NOTE — PROGRESS NOTES
[facility]  Alex Erwin M.D.  [phone]       Deanna Murray  1980    38 y.o. female     Date of Visit: 1/17/2023    Chief Complaint:   Chief Complaint   Patient presents with    Skin Lesion        I was asked to see this patient by Dr. Bajwa ref. provider found. History of Present Illness:  1. Urgent appointment-new lesion    New raised brown papule left hip. Noticed in the last couple of months-has lost weight and is now easy to see. Uncertain how long it has been present. Becomes raised and scaly. May have increased in size. New skin tag left neck-becoming easily traumatized. Raised, patient scratching at lesion. Photodamage. Progressive freckling and lentigines of sun exposed areas. Patient is wearing hats and sunscreen consistently. Allergic contact dermatitis left fourth finger beneath ring. Tried having rings clean, making sure that nothing becomes trapped underneath her ring such as hand . Still developing erythema and scale. Remembers this happening when she was younger with other rings. Clobetasol was helpful although she cannot find her current prescription. Skin history:  No PH skin ca. 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     Social History:   Social History     Socioeconomic History    Marital status:      Spouse name: Not on file    Number of children: Not on file    Years of education: Not on file    Highest education level: Not on file   Occupational History    Not on file   Tobacco Use Smoking status: Never    Smokeless tobacco: Never   Vaping Use    Vaping Use: Never used   Substance and Sexual Activity    Alcohol use: No    Drug use: No    Sexual activity: Not on file   Other Topics Concern    Not on file   Social History Narrative    Not on file     Social Determinants of Health     Financial Resource Strain: Not on file   Food Insecurity: Not on file   Transportation Needs: Not on file   Physical Activity: Not on file   Stress: Not on file   Social Connections: Not on file   Intimate Partner Violence: Not on file   Housing Stability: Not on file       Physical Examination       -General: Well-appearing, NAD  1. Limited exam  4 mm hyperkeratotic stuck on papule left hip  3 mm pedunculated flesh-colored to pink papule left neck  Diffuse background photodamage with freckling and lentigines-red hair  Mild erythema left dorsal fourth finger-minimal scale, not wearing rings      Assessment and Plan     1. Inflamed skin tag -left neck--Verbal consent obtained after risks (infection, bleeding, scar), benefits and alternatives explained. -Area(s) to be shaved were marked with a surgical pen. Site(s) were cleansed with alcohol. Local anesthesia achieved with 1% lidocaine with epinephrine/sodium bicarbonate. Removal of lesion performed with scissors hemostasis was achieved with aluminum chloride. The wound(s) were dressed with petrolatum and covered with a bandage. Specimen(s) sent to pathology. Pt educated re: risk of bleeding, infection, scar and wound care instructions. 2. Seborrheic keratoses, inflamed -left hip-1 after the risks, complications, and alternatives were explained to the patient, including risk of blister formation, discomfort, scar, hypopigmentation, patient elected to proceed with cryotherapy. Lesion(s) were treated w/ liquid nitrogen using a Cryogun. 1 freeze thaw cycle was performed with a freeze time of 1-5 seconds. There were no immediate complications.   Wound care instructions were discussed with the patient. 3. Allergic contact dermatitis due to metals-chronic condition, not improved-sample of Ultravate lotion to be used twice daily during flares. Discussed having rings coated. Also discussed evaluation through allergy   4. Diffuse photodamage of skin - Discussed changes in skin from chronic UV exposure and ways to mitigate further damage- hats when outside, SPF 50 or higher that is reapplied regularly, daily SPF application, UV protective clothing, and sun avoidance.      5. History of dysplastic nevus-total-body skin exam at her convenience

## 2023-01-19 LAB — DERMATOLOGY PATHOLOGY REPORT: NORMAL

## 2023-02-21 ENCOUNTER — OFFICE VISIT (OUTPATIENT)
Dept: DERMATOLOGY | Age: 43
End: 2023-02-21
Payer: COMMERCIAL

## 2023-02-21 DIAGNOSIS — Z86.018 HISTORY OF DYSPLASTIC NEVUS: ICD-10-CM

## 2023-02-21 DIAGNOSIS — D22.9 BENIGN NEVUS: Primary | ICD-10-CM

## 2023-02-21 DIAGNOSIS — L57.8 DIFFUSE PHOTODAMAGE OF SKIN: ICD-10-CM

## 2023-02-21 PROCEDURE — 99213 OFFICE O/P EST LOW 20 MIN: CPT | Performed by: DERMATOLOGY

## 2023-02-21 RX ORDER — TRIAMCINOLONE ACETONIDE 55 UG/1
2 SPRAY, METERED NASAL
COMMUNITY

## 2023-02-21 NOTE — PROGRESS NOTES
[facility]) Dermatology  Jigna Díaz M.D.  [phone]       Patricia Castanedatatyana  1980    38 y.o. female     Date of Visit: 2/21/2023    Chief Complaint:   Chief Complaint   Patient presents with    Skin Lesion        I was asked to see this patient by Dr. Bajwa ref. provider found. History of Present Illness:  1. TBSE    Multiple nevi. Patient has not noticed any new or changing pigmented lesions. Stable in size, shape, color. Not itching, bleeding. Compound nevus inferior to scar right anterior shoulder biopsied 8/22-recurrence. Not itching, bleeding. Asymptomatic    Allergic contact dermatitis beneath wedding rings resolved when she had her rings coated. Sometimes still has some irritation with prolonged wearing or irritants beneath rings-resolved with clobetasol topically    Mild scale and irritation of scalp-developed when she changed shampoos. Improved when she tried head and shoulders. Discussed changes in skin from chronic UV exposure and ways to mitigate further damage- hats when outside, SPF 50 or higher that is reapplied regularly, daily SPF application, UV protective clothing, and sun avoidance. Skin history:  No PH skin ca. 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     Social History:   Social History     Socioeconomic History    Marital status:      Spouse name: Not on file    Number of children: Not on file    Years of education: Not on file    Highest education level: Not on file   Occupational History    Not on file   Tobacco Use Smoking status: Never    Smokeless tobacco: Never   Vaping Use    Vaping Use: Never used   Substance and Sexual Activity    Alcohol use: No    Drug use: No    Sexual activity: Not on file   Other Topics Concern    Not on file   Social History Narrative    Not on file     Social Determinants of Health     Financial Resource Strain: Not on file   Food Insecurity: Not on file   Transportation Needs: Not on file   Physical Activity: Not on file   Stress: Not on file   Social Connections: Not on file   Intimate Partner Violence: Not on file   Housing Stability: Not on file       Physical Examination       -General: Well-appearing, NAD  1. Normal affect. Total body skin exam including scalp, face, neck, chest, abdomen, back, bilateral upper extremities, bilateral lower extremities, ocular conjunctiva, external lips, and nails was performed. Examination normal unless stated below. Underwear area not examined. Scattered on the trunk and extremities are multiple well-defined round and oval symmetric smoothly-bordered uniformly brown macules and papules. Diffuse background photodamage with freckling and lentigines. Well-healed scar right shoulder-inferior to scar at site of prior biopsy from 8/22 well-demarcated raised tan nevus  Very slight scale throughout scalp but no rash    Assessment and Plan     1. Benign nevus - Benign acquired melanocytic nevi  -Recommend monthly self skin exams   -Educated regarding the ABCDEs of melanoma detection   -Call for any new/changing moles or concerning lesions  -Reviewed sun protective behavior -- sun avoidance during the peak hours of the day, sun-protective clothing (including hat and sunglasses), sunscreen use (water resistant, broad spectrum, SPF at least 30, need for reapplication every 2 to 3 hours), avoidance of tanning beds      2.  Diffuse photodamage of skin - Discussed changes in skin from chronic UV exposure and ways to mitigate further damage- hats when outside, SPF 50 or higher that is reapplied regularly, daily SPF application, UV protective clothing, and sun avoidance. 3. History of dysplastic nevus -monitor for new or changing pigmented lesions   Discussed scale and scalp-suspect irritant dermatitis from shampoo. Change shampoos.

## 2023-06-13 ENCOUNTER — OFFICE VISIT (OUTPATIENT)
Dept: DERMATOLOGY | Age: 43
End: 2023-06-13

## 2023-06-13 DIAGNOSIS — D22.9 SPITZ NEVUS: ICD-10-CM

## 2023-06-13 DIAGNOSIS — L57.8 DIFFUSE PHOTODAMAGE OF SKIN: Primary | ICD-10-CM

## 2023-06-19 LAB — DERMATOLOGY PATHOLOGY REPORT: NORMAL

## 2023-06-21 ENCOUNTER — PATIENT MESSAGE (OUTPATIENT)
Dept: DERMATOLOGY | Age: 43
End: 2023-06-21

## 2023-06-22 NOTE — TELEPHONE ENCOUNTER
From: Oksana Hernandez  To: Dr. April Duboset: 6/21/2023 9:28 AM EDT  Subject: Test results     Hi Dr. Ludivina Carranza,    I saw my path report, will I need to come in again for this mole? If so just want to make sure Im all healed prior to leaving for [place] on July 11th. Thanks!

## 2023-06-27 ENCOUNTER — OFFICE VISIT (OUTPATIENT)
Dept: DERMATOLOGY | Age: 43
End: 2023-06-27

## 2023-06-27 DIAGNOSIS — Z48.89 SUTURE CHECK: Primary | ICD-10-CM

## 2023-06-27 DIAGNOSIS — D22.9 SPITZ NEVUS: Primary | ICD-10-CM

## 2023-06-27 PROCEDURE — 99999 PR OFFICE/OUTPT VISIT,PROCEDURE ONLY: CPT | Performed by: DERMATOLOGY

## 2023-07-05 ENCOUNTER — PATIENT MESSAGE (OUTPATIENT)
Dept: DERMATOLOGY | Age: 43
End: 2023-07-05

## 2023-07-07 DIAGNOSIS — D48.5 SPITZOID NEOPLASM OF UNCERTAIN MALIGNANT POTENTIAL: Primary | ICD-10-CM

## 2023-07-07 NOTE — TELEPHONE ENCOUNTER
Pls let her know I would prefer a sooner appointment if she can adjust her work schedule. I will see her at the beginning of August to check the site and ensure no evidence of recurrence, but we would love to get this done as soon as she gets back from vacation in [facility]. Spoke to patient and made aware of Dr. Terri Benoit message above. Patient currently scheduled with Dr. Zina Plaza on 9/25 and unsure if this is the direction she wants to take along with timing of required work travel. Patient checking to see if sooner procedure can be done with another provider. Referral placed with Dr. Clayton Garduno. All pertinent information faxed to [phone].

## 2023-07-25 ENCOUNTER — TELEPHONE (OUTPATIENT)
Dept: DERMATOLOGY | Age: 43
End: 2023-07-25

## 2023-07-25 NOTE — TELEPHONE ENCOUNTER
Dr. Anne Gordillo with [facility] would like to speak with Dr. Ceasar Corado about this pt when she has a moment. Dr. Flores Barrier office says it is ok to reach her at her cell number. It is [phone].

## 2023-08-03 NOTE — TELEPHONE ENCOUNTER
Pt called today to cancel her appt with Dr. Ivory Qureshi on 9/25 at 2:00 pm.     I pulled her up and did not see an appt; then looked at the \"past appts\" and and the appt was cancelled back on 7/11/2023, from a note from Dr. Delilah Burns that pt will be treated by another office.

## 2023-08-22 ENCOUNTER — OFFICE VISIT (OUTPATIENT)
Dept: DERMATOLOGY | Age: 43
End: 2023-08-22
Payer: COMMERCIAL

## 2023-08-22 DIAGNOSIS — Z86.018 HISTORY OF DYSPLASTIC NEVUS: ICD-10-CM

## 2023-08-22 DIAGNOSIS — L57.8 DIFFUSE PHOTODAMAGE OF SKIN: ICD-10-CM

## 2023-08-22 DIAGNOSIS — L91.8 INFLAMED SKIN TAG: ICD-10-CM

## 2023-08-22 DIAGNOSIS — D22.9 BENIGN NEVUS: Primary | ICD-10-CM

## 2023-08-22 PROCEDURE — 99213 OFFICE O/P EST LOW 20 MIN: CPT | Performed by: DERMATOLOGY

## 2023-08-22 PROCEDURE — 11200 RMVL SKIN TAGS UP TO&INC 15: CPT | Performed by: DERMATOLOGY

## 2023-08-22 NOTE — PROGRESS NOTES
[facility]) Dermatology  Jon Ji M.D.  [phone]       Rasheed Draper  1980    38 y.o. female     Date of Visit: 8/22/2023    Chief Complaint:   Chief Complaint   Patient presents with    Skin Lesion        I was asked to see this patient by Dr. Bajwa ref. provider found. History of Present Illness:  1. TBSE    Multiple nevi. Patient has not noticed any new or changing pigmented lesions. Stable in size, shape, color. Not itching, bleeding. Photodamage. Progressive freckling and lentigines of sun exposed areas. Patient is wearing hats and sunscreen consistently. Irritated skin tag left neck-becoming traumatized, irritated due to location. Skin history:  No PH skin ca. 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     Social History:   Social History     Socioeconomic History    Marital status:      Spouse name: Not on file    Number of children: Not on file    Years of education: Not on file    Highest education level: Not on file   Occupational History    Not on file   Tobacco Use    Smoking status: Never    Smokeless tobacco: Never   Vaping Use    Vaping Use: Never used   Substance and Sexual Activity    Alcohol use: No    Drug use: No    Sexual activity: Not on file   Other Topics Concern    Not on file   Social History Narrative    Not on file     Social Determinants of Health

## 2024-02-20 ENCOUNTER — OFFICE VISIT (OUTPATIENT)
Dept: DERMATOLOGY | Age: 44
End: 2024-02-20
Payer: COMMERCIAL

## 2024-02-20 DIAGNOSIS — L71.0 PERIORAL DERMATITIS: ICD-10-CM

## 2024-02-20 DIAGNOSIS — D22.9 BENIGN NEVUS: Primary | ICD-10-CM

## 2024-02-20 DIAGNOSIS — Z86.018 HISTORY OF DYSPLASTIC NEVUS: ICD-10-CM

## 2024-02-20 DIAGNOSIS — B07.9 VERRUCA: ICD-10-CM

## 2024-02-20 PROCEDURE — 17110 DESTRUCTION B9 LES UP TO 14: CPT | Performed by: DERMATOLOGY

## 2024-02-20 PROCEDURE — 99213 OFFICE O/P EST LOW 20 MIN: CPT | Performed by: DERMATOLOGY

## 2024-02-20 RX ORDER — CETIRIZINE HYDROCHLORIDE 10 MG/1
10 TABLET ORAL DAILY
COMMUNITY

## 2024-02-20 RX ORDER — AMLODIPINE BESYLATE 5 MG/1
5 TABLET ORAL DAILY
COMMUNITY
Start: 2024-02-07

## 2024-02-20 NOTE — PROGRESS NOTES
[facility]  Paulette Knox M.D.  [phone]       Anai Rowan  1980    43 y.o. female     Date of Visit: 2/20/2024    Chief Complaint:   Chief Complaint   Patient presents with    Skin Lesion        I was asked to see this patient by Dr. Bajwa ref. provider found.    History of Present Illness:  1. TBSE    Multiple nevi. Patient has not noticed any new or changing pigmented lesions.   Stable in size, shape, color. Not itching, bleeding.     Viral verruca left plantar foot at heel.  Asymptomatic but has noted that they are increasing in number.  Not previously treated.    New rash on her chin.  New in the last several weeks.  Not pruritic.  Not previously treated.  Has not used topical steroids and not on an inhaler     Skin history:  No PH skin ca.  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    Social History:   Social History     Socioeconomic History    Marital status:      Spouse name: Not on file    Number of children: Not on file    Years of education: Not on file    Highest education level: Not on file   Occupational History    Not on file   Tobacco Use    Smoking status: Never    Smokeless tobacco: Never   Vaping Use    Vaping Use: Never used   Substance and Sexual Activity    Alcohol use: No    Drug use: No    Sexual activity: Not on file

## 2024-08-05 ENCOUNTER — PATIENT MESSAGE (OUTPATIENT)
Dept: DERMATOLOGY | Age: 44
End: 2024-08-05

## 2024-08-05 NOTE — TELEPHONE ENCOUNTER
From: Anai Rowan  To: Dr. Paulette Knox  Sent: 8/5/2024 3:17 PM EDT  Subject: Rash - skin redness     I keep getting a red spot on my neck. I have cleaned my necklaces and am only wearing real gold necklaces. Not sure what to do. It itches.    Thank you!!!

## 2024-08-27 ENCOUNTER — OFFICE VISIT (OUTPATIENT)
Dept: DERMATOLOGY | Age: 44
End: 2024-08-27
Payer: COMMERCIAL

## 2024-08-27 DIAGNOSIS — Z86.018 HISTORY OF DYSPLASTIC NEVUS: ICD-10-CM

## 2024-08-27 DIAGNOSIS — L71.0 PERIORAL DERMATITIS: Primary | ICD-10-CM

## 2024-08-27 DIAGNOSIS — D22.9 BENIGN NEVUS: ICD-10-CM

## 2024-08-27 DIAGNOSIS — L24.9 IRRITANT DERMATITIS: ICD-10-CM

## 2024-08-27 PROCEDURE — 99214 OFFICE O/P EST MOD 30 MIN: CPT | Performed by: DERMATOLOGY

## 2024-08-27 NOTE — PROGRESS NOTES
[facility] Dermatology  Paulette Knox M.D.  [phone]       Anai Rowan  1980    43 y.o. female     Date of Visit: 8/27/2024    Chief Complaint:   Chief Complaint   Patient presents with    Skin Lesion        I was asked to see this patient by Dr. Bajwa ref. provider found.    History of Present Illness:  1. TBSE    Multiple nevi. Patient has not noticed any new or changing pigmented lesions.   Stable in size, shape, color. Not itching, bleeding.     Recurrence of perioral dermatitis right cheek-Chin cleared with MetroGel 0.75% twice daily.  New involvement right cheek just started in the last few days.  Not to get treated    Irritant dermatitis left neck.  Developed during the summer.  Was wearing necklaces but no change to jewelry.  Tried using her MetroCream, modest improvement    Skin history:  No PH skin ca.  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    Social History:   Social History     Socioeconomic History    Marital status:      Spouse name: Not on file    Number of children: Not on file    Years of education: Not on file    Highest education level: Not on file   Occupational History    Not on file   Tobacco Use    Smoking status: Never    Smokeless tobacco: Never   Vaping Use    Vaping status: Never Used   Substance and Sexual  Activity    Alcohol use: No    Drug use: No    Sexual activity: Not on file   Other Topics Concern    Not on file   Social History Narrative    Not on file     Social Determinants of Health     Financial Resource Strain: Not on file   Food Insecurity: Unknown (1/21/2024)    Received from [employer]     Food Insecurities     Worried about running out of food: Not on file     Food Bought: Not on file   Transportation Needs: Unknown (1/21/2024)    Received from [employer]     Transportation     Worried about transportation: Not on file   Physical Activity: Not on file   Stress: Not on file   Social Connections: Not on file   Intimate Partner Violence: Unknown (1/21/2024)    Received from [employer]     Interpersonal Safety     Feel physically or emotionally unsafe where currently live: Not on file     Harm by anyone: Not on file     Emotionally Harmed: Not on file   Housing Stability: Unknown (1/21/2024)    Received from [employer]     Housing/Utilities     Worried about losing home: Not on file     Stayed outside house: Not on file     Unable to get utilities: Not on file       Physical Examination       -General: Well-appearing, NAD  1.  Erythematous scaly plaque right cheek.  Mild erythema and scale left neck    Scattered on the trunk and extremities are multiple well-defined round and oval symmetric smoothly-bordered uniformly brown macules and papules.     Well-healed scar no sign of recurrence      Assessment and Plan     1. Perioral dermatitis-restart MetroCream 0.75% twice daily   2. Irritant dermatitis-clobetasol cream twice daily up to 2 weeks.  Reviewed proper use.  Avoid face   3. Benign nevus - Benign acquired melanocytic nevi  -Recommend monthly self skin exams   -Educated regarding the ABCDEs of melanoma detection   -Call for any new/changing moles or concerning lesions  -Reviewed sun protective behavior -- sun avoidance during the peak hours of the day,

## 2025-02-25 ENCOUNTER — OFFICE VISIT (OUTPATIENT)
Age: 45
End: 2025-02-25
Payer: COMMERCIAL

## 2025-02-25 DIAGNOSIS — D22.9 BENIGN NEVUS: ICD-10-CM

## 2025-02-25 DIAGNOSIS — L30.0 NUMMULAR DERMATITIS: ICD-10-CM

## 2025-02-25 DIAGNOSIS — Z86.018 HISTORY OF DYSPLASTIC NEVUS: ICD-10-CM

## 2025-02-25 DIAGNOSIS — L71.9 ROSACEA: Primary | ICD-10-CM

## 2025-02-25 PROCEDURE — 99214 OFFICE O/P EST MOD 30 MIN: CPT | Performed by: DERMATOLOGY

## 2025-02-25 NOTE — PROGRESS NOTES
[facility]ermat[facility]  Paulette Knox M.D.  [phone]       Anai Rowan  1980    44 y.o. female     Date of Visit: 2/25/2025    Chief Complaint:   Chief Complaint   Patient presents with    Lesion(s)        I was asked to see this patient by Dr. Bajwa ref. provider found.    History of Present Illness:  1.     History of Present Illness  The patient presents for a follow-up comprehensive skin examination.    Rosacea  - The patient utilizes metronidazole cream on an as-needed basis for the management of rosacea/perioral dermatitis, which she describes as bothersome.    Skin Care Routine  - She applies a facial moisturizer nightly and uses CeraVe for body hydration, albeit inconsistently.  - She has previously applied CeraVe to her face without experiencing any adverse reactions.  - The patient uses Safeguard soap    Develops occasional pruritic scaly papules-recently 1 on her right wrist, also recurring area on her left neck.  All effectively treated with clobetasol-usually resolve within a few days.    Multiple nevi. Patient has not noticed any new or changing pigmented lesions.   Stable in size, shape, color. Not itching, bleeding.     Supplemental Information: The patient denies the presence of melanonychia under nail polish on both fingers and toes.    FAMILY HISTORY  Mother has bladder cancer, complications from a neobladder, and a recent influenza infection.     Skin history:  No PH skin ca.  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

## 2025-07-15 ENCOUNTER — TELEPHONE (OUTPATIENT)
Age: 45
End: 2025-07-15

## 2025-07-15 NOTE — TELEPHONE ENCOUNTER
Pt has appointment in September. She has a mole on rt shoulder that has gotten darker and bigger in the last month..     [phone]

## 2025-07-16 ENCOUNTER — PATIENT MESSAGE (OUTPATIENT)
Age: 45
End: 2025-07-16

## 2025-07-22 ENCOUNTER — OFFICE VISIT (OUTPATIENT)
Age: 45
End: 2025-07-22
Payer: COMMERCIAL

## 2025-07-22 DIAGNOSIS — D48.5 NEOPLASM OF UNCERTAIN BEHAVIOR OF SKIN: Primary | ICD-10-CM

## 2025-07-22 DIAGNOSIS — L57.8 DIFFUSE PHOTODAMAGE OF SKIN: ICD-10-CM

## 2025-07-22 PROCEDURE — 11102 TANGNTL BX SKIN SINGLE LES: CPT | Performed by: DERMATOLOGY

## 2025-07-22 PROCEDURE — 99212 OFFICE O/P EST SF 10 MIN: CPT | Performed by: DERMATOLOGY

## 2025-07-22 NOTE — PROGRESS NOTES
[facility]  Paulette Knox M.D.  [phone]       Anai Rowan  1980    44 y.o. female     Date of Visit: 7/22/2025    Chief Complaint:   Chief Complaint   Patient presents with    Skin Lesion        I was asked to see this patient by Dr. Bajwa ref. provider found.    History of Present Illness:  1.  Urgent visit-changing lesion    Nevus right supraclavicular neck-had been present for an unknown period of time but is not new, however in the last few weeks has become darkly pigmented, new darker papule within-reminds her of her previous Spitz nevus.       Skin history:  No PH skin ca.  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    Social History:   Social History     Socioeconomic History    Marital status:      Spouse name: Not on file    Number of children: Not on file    Years of education: Not on file    Highest education level: Not on file   Occupational History    Not on file   Tobacco Use    Smoking status: Never    Smokeless tobacco: Never   Vaping Use    Vaping status: Never Used   Substance and Sexual Activity    Alcohol use: No    Drug use: No    Sexual activity: Not on file   Other Topics Concern    Not on file   Social History

## 2025-07-29 LAB — DERMATOLOGY PATHOLOGY REPORT: NORMAL

## 2025-07-30 ENCOUNTER — RESULTS FOLLOW-UP (OUTPATIENT)
Age: 45
End: 2025-07-30

## 2025-07-30 DIAGNOSIS — D23.61: Primary | ICD-10-CM

## 2025-09-02 ENCOUNTER — OFFICE VISIT (OUTPATIENT)
Age: 45
End: 2025-09-02
Payer: COMMERCIAL

## 2025-09-02 DIAGNOSIS — D48.5 NEOPLASM OF UNCERTAIN BEHAVIOR OF SKIN: ICD-10-CM

## 2025-09-02 DIAGNOSIS — D22.9 BENIGN NEVUS: Primary | ICD-10-CM

## 2025-09-02 DIAGNOSIS — Z86.018 HISTORY OF DYSPLASTIC NEVUS: ICD-10-CM

## 2025-09-02 DIAGNOSIS — L57.8 DIFFUSE PHOTODAMAGE OF SKIN: ICD-10-CM

## 2025-09-02 PROCEDURE — 11102 TANGNTL BX SKIN SINGLE LES: CPT | Performed by: DERMATOLOGY

## 2025-09-02 PROCEDURE — 99213 OFFICE O/P EST LOW 20 MIN: CPT | Performed by: DERMATOLOGY
